# Patient Record
Sex: MALE | Race: WHITE | Employment: OTHER | URBAN - METROPOLITAN AREA
[De-identification: names, ages, dates, MRNs, and addresses within clinical notes are randomized per-mention and may not be internally consistent; named-entity substitution may affect disease eponyms.]

---

## 2019-08-02 ENCOUNTER — HOSPITAL ENCOUNTER (EMERGENCY)
Age: 84
Discharge: HOME | End: 2019-08-02
Attending: EMERGENCY MEDICINE
Payer: OTHER GOVERNMENT

## 2019-08-02 ENCOUNTER — APPOINTMENT (OUTPATIENT)
Dept: GENERAL RADIOLOGY | Age: 84
End: 2019-08-02
Attending: EMERGENCY MEDICINE
Payer: OTHER GOVERNMENT

## 2019-08-02 VITALS
HEIGHT: 72 IN | BODY MASS INDEX: 20.56 KG/M2 | DIASTOLIC BLOOD PRESSURE: 74 MMHG | OXYGEN SATURATION: 96 % | TEMPERATURE: 98.5 F | RESPIRATION RATE: 21 BRPM | SYSTOLIC BLOOD PRESSURE: 143 MMHG | HEART RATE: 87 BPM | WEIGHT: 151.8 LBS

## 2019-08-02 DIAGNOSIS — R89.9 ABNORMAL LABORATORY TEST: Primary | ICD-10-CM

## 2019-08-02 LAB
ANION GAP SERPL CALC-SCNC: 9 MMOL/L (ref 7–16)
ATRIAL RATE: 88 BPM
BUN SERPL-MCNC: 16 MG/DL (ref 8–23)
CALCIUM SERPL-MCNC: 8.9 MG/DL (ref 8.3–10.4)
CALCULATED P AXIS, ECG09: 69 DEGREES
CALCULATED R AXIS, ECG10: 38 DEGREES
CALCULATED T AXIS, ECG11: 80 DEGREES
CHLORIDE SERPL-SCNC: 102 MMOL/L (ref 98–107)
CO2 SERPL-SCNC: 29 MMOL/L (ref 21–32)
CREAT SERPL-MCNC: 1.03 MG/DL (ref 0.8–1.5)
DIAGNOSIS, 93000: NORMAL
ERYTHROCYTE [DISTWIDTH] IN BLOOD BY AUTOMATED COUNT: 13.8 % (ref 11.9–14.6)
GLUCOSE SERPL-MCNC: 93 MG/DL (ref 65–100)
HCT VFR BLD AUTO: 37.4 % (ref 41.1–50.3)
HGB BLD-MCNC: 11.9 G/DL (ref 13.6–17.2)
LACTATE BLD-SCNC: 1.35 MMOL/L (ref 0.5–1.9)
MCH RBC QN AUTO: 29.9 PG (ref 26.1–32.9)
MCHC RBC AUTO-ENTMCNC: 31.8 G/DL (ref 31.4–35)
MCV RBC AUTO: 94 FL (ref 79.6–97.8)
NRBC # BLD: 0 K/UL (ref 0–0.2)
P-R INTERVAL, ECG05: 170 MS
PLATELET # BLD AUTO: 289 K/UL (ref 150–450)
PMV BLD AUTO: 11.4 FL (ref 9.4–12.3)
POTASSIUM SERPL-SCNC: 3.9 MMOL/L (ref 3.5–5.1)
Q-T INTERVAL, ECG07: 332 MS
QRS DURATION, ECG06: 72 MS
QTC CALCULATION (BEZET), ECG08: 403 MS
RBC # BLD AUTO: 3.98 M/UL (ref 4.23–5.6)
SODIUM SERPL-SCNC: 140 MMOL/L (ref 136–145)
VENTRICULAR RATE, ECG03: 89 BPM
WBC # BLD AUTO: 7.7 K/UL (ref 4.3–11.1)

## 2019-08-02 PROCEDURE — 85027 COMPLETE CBC AUTOMATED: CPT

## 2019-08-02 PROCEDURE — 83605 ASSAY OF LACTIC ACID: CPT

## 2019-08-02 PROCEDURE — 99285 EMERGENCY DEPT VISIT HI MDM: CPT | Performed by: EMERGENCY MEDICINE

## 2019-08-02 PROCEDURE — 93005 ELECTROCARDIOGRAM TRACING: CPT | Performed by: EMERGENCY MEDICINE

## 2019-08-02 PROCEDURE — 80048 BASIC METABOLIC PNL TOTAL CA: CPT

## 2019-08-02 PROCEDURE — 71045 X-RAY EXAM CHEST 1 VIEW: CPT

## 2019-08-02 NOTE — ED TRIAGE NOTES
Pt presents with 80 yom from [facility] for abnormal lab values , elevated BNP. PT stated no complaints with [facility]. He is AAOx1, hx of Dementia per EMS.  Pt has a 20G LAC, BGL 91.jd saab , ems placed pt on 2lpm NC.

## 2019-08-02 NOTE — ED PROVIDER NOTES
22-year-old gentleman presents with concerns about having an increasing BNP. Patient is currently getting treatment at the [facility] for behavioral health. When he was admitted there he apparently had a slightly elevated BNP and then he was found a couple of days ago to have had an infiltrate on his chest x-ray. He has been receiving 1 g of Rocephin intramuscularly daily for the past 2 days for that infiltrate. Today he had jolts of blood work drawn that showed his BNP has increased to over 1000. Staff there said that he did not appear to have any increased work of breathing or difficulty breathing. They noted that his room air sats have remained normal.    Patient has some fairly significant dementia and I am unable to get a direct history from him. Elements of this note were created using speech recognition software. As such, errors of speech recognition may be present. No past medical history on file. No past surgical history on file. No family history on file.     Social History     Socioeconomic History    Marital status: Not on file     Spouse name: Not on file    Number of children: Not on file    Years of education: Not on file    Highest education level: Not on file   Occupational History    Not on file   Social Needs    Financial resource strain: Not on file    Food insecurity:     Worry: Not on file     Inability: Not on file    Transportation needs:     Medical: Not on file     Non-medical: Not on file   Tobacco Use    Smoking status: Not on file   Substance and Sexual Activity    Alcohol use: Not on file    Drug use: Not on file    Sexual activity: Not on file   Lifestyle    Physical activity:     Days per week: Not on file     Minutes per session: Not on file    Stress: Not on file   Relationships    Social connections:     Talks on phone: Not on file     Gets together: Not on file     Attends Judaism service: Not on file     Active member of club or organization: Not on file     Attends meetings of clubs or organizations: Not on file     Relationship status: Not on file    Intimate partner violence:     Fear of current or ex partner: Not on file     Emotionally abused: Not on file     Physically abused: Not on file     Forced sexual activity: Not on file   Other Topics Concern    Not on file   Social History Narrative    Not on file         ALLERGIES: Patient has no known allergies. Review of Systems   Constitutional: Negative for chills and fever. Review of systems obtained from EMS from staff at [facility]   Respiratory: Negative for cough and stridor. Gastrointestinal: Negative for vomiting. Vitals:    08/02/19 1259   BP: 134/61   Pulse: 86   Resp: 18   Temp: 98.5 °F (36.9 °C)   SpO2: 98%   Weight: 68.9 kg (151 lb 12.8 oz)   Height: 6' (1.829 m)            Physical Exam   Constitutional: He appears well-developed and well-nourished. Cardiovascular: Normal rate and regular rhythm. Pulmonary/Chest: Effort normal.   Faint scattered rales   Abdominal: He exhibits no distension. There is no tenderness. There is no guarding. Neurological:   Patient does not interact well with the exam   Nursing note and vitals reviewed. MDM  Number of Diagnoses or Management Options  Diagnosis management comments: Lactic acid is negative. His white count is unremarkable. I will get an x-ray to evaluate for infiltrate. Clinically he looks fairly decent. XR CHEST SNGL V   Final Result    IMPRESSION: No consolidation. END OF WET READ REPORT  I reviewed the complete radiology report. I included only the impression here.     Abnormal Labs Reviewed  CBC W/O DIFF - Abnormal; Notable for the following components:     RBC                           3.98 (*)               HGB                           11.9 (*)               HCT                           37.4 (*)            All other components within normal limits  I reviewed these abnormalities and I think they are relatively minor and do not represent critical abnormalities. I do not think they contribute to the patient's current presentation. They do not require further evaluation in the emergency department. I did not find anything urgent or emergent. He does not appear to have a current infiltrate and he has been receiving antibiotics. He should continue that course of antibiotics until it is completed. I do not think he needs any urgent or emergent treatment at this time and I will discharge him back.          Procedures

## 2019-08-02 NOTE — ED NOTES
Report given to [facility] in [address] at [facility]. [facility] is aware that pt is to be returning to them and that pt's vitals have remained stable with no s/sx of shob during stay. Aware discharge paperwork was given to em.

## 2019-08-02 NOTE — DISCHARGE INSTRUCTIONS
Return with any fevers, vomiting, difficulty breathing, coughing up blood, worsening symptoms, or additional concerns. Follow up with your primary care doctor as needed.

## 2019-08-02 NOTE — ED NOTES
I have reviewed discharge instructions with the caregiver. The caregiver verbalized understanding. Patient left ED via Discharge Method: stretcher to [facility] with samira    Opportunity for questions and clarification provided. Patient given 0 scripts. To continue your aftercare when you leave the hospital, you may receive an automated call from our care team to check in on how you are doing. This is a free service and part of our promise to provide the best care and service to meet your aftercare needs.  If you have questions, or wish to unsubscribe from this service please call [phone]. Thank you for Choosing our [facility] Emergency Department.

## 2019-08-03 ENCOUNTER — HOSPITAL ENCOUNTER (EMERGENCY)
Age: 84
Discharge: HOME | End: 2019-08-03
Attending: EMERGENCY MEDICINE
Payer: OTHER GOVERNMENT

## 2019-08-03 ENCOUNTER — APPOINTMENT (OUTPATIENT)
Dept: GENERAL RADIOLOGY | Age: 84
End: 2019-08-03
Attending: EMERGENCY MEDICINE
Payer: OTHER GOVERNMENT

## 2019-08-03 ENCOUNTER — APPOINTMENT (OUTPATIENT)
Dept: CT IMAGING | Age: 84
End: 2019-08-03
Attending: EMERGENCY MEDICINE
Payer: OTHER GOVERNMENT

## 2019-08-03 VITALS
WEIGHT: 151.8 LBS | RESPIRATION RATE: 24 BRPM | HEART RATE: 76 BPM | DIASTOLIC BLOOD PRESSURE: 72 MMHG | OXYGEN SATURATION: 98 % | SYSTOLIC BLOOD PRESSURE: 134 MMHG | BODY MASS INDEX: 29.8 KG/M2 | HEIGHT: 60 IN | TEMPERATURE: 98.7 F

## 2019-08-03 DIAGNOSIS — R06.02 SOB (SHORTNESS OF BREATH): Primary | ICD-10-CM

## 2019-08-03 LAB
ALBUMIN SERPL-MCNC: 2.9 G/DL (ref 3.2–4.6)
ALBUMIN/GLOB SERPL: 0.7 {RATIO} (ref 1.2–3.5)
ALP SERPL-CCNC: 74 U/L (ref 50–136)
ALT SERPL-CCNC: 8 U/L (ref 12–65)
ANION GAP SERPL CALC-SCNC: 5 MMOL/L (ref 7–16)
APPEARANCE UR: CLEAR
ARTERIAL PATENCY WRIST A: YES
AST SERPL-CCNC: 38 U/L (ref 15–37)
ATRIAL RATE: 84 BPM
BASE EXCESS BLD CALC-SCNC: 3 MMOL/L
BASOPHILS # BLD: 0 K/UL (ref 0–0.2)
BASOPHILS NFR BLD: 0 % (ref 0–2)
BDY SITE: ABNORMAL
BILIRUB SERPL-MCNC: 0.7 MG/DL (ref 0.2–1.1)
BILIRUB UR QL: ABNORMAL
BNP SERPL-MCNC: 216 PG/ML
BODY TEMPERATURE: 98.6
BUN SERPL-MCNC: 21 MG/DL (ref 8–23)
CALCIUM SERPL-MCNC: 8.8 MG/DL (ref 8.3–10.4)
CALCULATED P AXIS, ECG09: 78 DEGREES
CALCULATED R AXIS, ECG10: 31 DEGREES
CALCULATED T AXIS, ECG11: 75 DEGREES
CHLORIDE SERPL-SCNC: 103 MMOL/L (ref 98–107)
CO2 BLD-SCNC: 27 MMOL/L
CO2 SERPL-SCNC: 30 MMOL/L (ref 21–32)
COLLECT TIME,HTIME: 1218
COLOR UR: ABNORMAL
CREAT SERPL-MCNC: 1.19 MG/DL (ref 0.8–1.5)
D DIMER PPP FEU-MCNC: 2.51 UG/ML(FEU)
DIAGNOSIS, 93000: NORMAL
DIFFERENTIAL METHOD BLD: ABNORMAL
EOSINOPHIL # BLD: 0 K/UL (ref 0–0.8)
EOSINOPHIL NFR BLD: 0 % (ref 0.5–7.8)
ERYTHROCYTE [DISTWIDTH] IN BLOOD BY AUTOMATED COUNT: 13.7 % (ref 11.9–14.6)
GAS FLOW.O2 O2 DELIVERY SYS: ABNORMAL L/MIN
GLOBULIN SER CALC-MCNC: 4.3 G/DL (ref 2.3–3.5)
GLUCOSE SERPL-MCNC: 118 MG/DL (ref 65–100)
GLUCOSE UR STRIP.AUTO-MCNC: NEGATIVE MG/DL
HCO3 BLD-SCNC: 26.4 MMOL/L (ref 22–26)
HCT VFR BLD AUTO: 35.2 % (ref 41.1–50.3)
HGB BLD-MCNC: 11.3 G/DL (ref 13.6–17.2)
HGB UR QL STRIP: NEGATIVE
IMM GRANULOCYTES # BLD AUTO: 0 K/UL (ref 0–0.5)
IMM GRANULOCYTES NFR BLD AUTO: 0 % (ref 0–5)
KETONES UR QL STRIP.AUTO: ABNORMAL MG/DL
LACTATE BLD-SCNC: 1.37 MMOL/L (ref 0.5–1.9)
LEUKOCYTE ESTERASE UR QL STRIP.AUTO: NEGATIVE
LIPASE SERPL-CCNC: 155 U/L (ref 73–393)
LYMPHOCYTES # BLD: 1.1 K/UL (ref 0.5–4.6)
LYMPHOCYTES NFR BLD: 10 % (ref 13–44)
MCH RBC QN AUTO: 30 PG (ref 26.1–32.9)
MCHC RBC AUTO-ENTMCNC: 32.1 G/DL (ref 31.4–35)
MCV RBC AUTO: 93.4 FL (ref 79.6–97.8)
MONOCYTES # BLD: 1.5 K/UL (ref 0.1–1.3)
MONOCYTES NFR BLD: 14 % (ref 4–12)
NEUTS SEG # BLD: 8.3 K/UL (ref 1.7–8.2)
NEUTS SEG NFR BLD: 76 % (ref 43–78)
NITRITE UR QL STRIP.AUTO: NEGATIVE
NRBC # BLD: 0 K/UL (ref 0–0.2)
O2/TOTAL GAS SETTING VFR VENT: 21 %
P-R INTERVAL, ECG05: 162 MS
PCO2 BLD: 34.3 MMHG (ref 35–45)
PH BLD: 7.49 [PH] (ref 7.35–7.45)
PH UR STRIP: 5.5 [PH] (ref 5–9)
PLATELET # BLD AUTO: 262 K/UL (ref 150–450)
PMV BLD AUTO: 10.6 FL (ref 9.4–12.3)
PO2 BLD: 108 MMHG (ref 75–100)
POTASSIUM SERPL-SCNC: 4.2 MMOL/L (ref 3.5–5.1)
PROT SERPL-MCNC: 7.2 G/DL (ref 6.3–8.2)
PROT UR STRIP-MCNC: NEGATIVE MG/DL
Q-T INTERVAL, ECG07: 372 MS
QRS DURATION, ECG06: 72 MS
QTC CALCULATION (BEZET), ECG08: 420 MS
RBC # BLD AUTO: 3.77 M/UL (ref 4.23–5.6)
SAO2 % BLD: 99 % (ref 95–98)
SERVICE CMNT-IMP: ABNORMAL
SERVICE CMNT-IMP: ABNORMAL
SODIUM SERPL-SCNC: 138 MMOL/L (ref 136–145)
SP GR UR REFRACTOMETRY: 1.03 (ref 1–1.02)
SPECIMEN TYPE: ABNORMAL
UROBILINOGEN UR QL STRIP.AUTO: 4 EU/DL (ref 0.2–1)
VENTRICULAR RATE, ECG03: 77 BPM
WBC # BLD AUTO: 10.8 K/UL (ref 4.3–11.1)

## 2019-08-03 PROCEDURE — 77030011943

## 2019-08-03 PROCEDURE — 83605 ASSAY OF LACTIC ACID: CPT

## 2019-08-03 PROCEDURE — 96360 HYDRATION IV INFUSION INIT: CPT | Performed by: EMERGENCY MEDICINE

## 2019-08-03 PROCEDURE — 82803 BLOOD GASES ANY COMBINATION: CPT

## 2019-08-03 PROCEDURE — 70450 CT HEAD/BRAIN W/O DYE: CPT

## 2019-08-03 PROCEDURE — 85379 FIBRIN DEGRADATION QUANT: CPT

## 2019-08-03 PROCEDURE — 81003 URINALYSIS AUTO W/O SCOPE: CPT

## 2019-08-03 PROCEDURE — 83880 ASSAY OF NATRIURETIC PEPTIDE: CPT

## 2019-08-03 PROCEDURE — 71045 X-RAY EXAM CHEST 1 VIEW: CPT

## 2019-08-03 PROCEDURE — 80053 COMPREHEN METABOLIC PANEL: CPT

## 2019-08-03 PROCEDURE — 93005 ELECTROCARDIOGRAM TRACING: CPT | Performed by: EMERGENCY MEDICINE

## 2019-08-03 PROCEDURE — 36600 WITHDRAWAL OF ARTERIAL BLOOD: CPT

## 2019-08-03 PROCEDURE — 99285 EMERGENCY DEPT VISIT HI MDM: CPT | Performed by: EMERGENCY MEDICINE

## 2019-08-03 PROCEDURE — 85025 COMPLETE CBC W/AUTO DIFF WBC: CPT

## 2019-08-03 PROCEDURE — 51701 INSERT BLADDER CATHETER: CPT | Performed by: EMERGENCY MEDICINE

## 2019-08-03 PROCEDURE — 74011250636 HC RX REV CODE- 250/636: Performed by: EMERGENCY MEDICINE

## 2019-08-03 PROCEDURE — 73030 X-RAY EXAM OF SHOULDER: CPT

## 2019-08-03 PROCEDURE — 83690 ASSAY OF LIPASE: CPT

## 2019-08-03 RX ADMIN — SODIUM CHLORIDE 1000 ML: 900 INJECTION, SOLUTION INTRAVENOUS at 11:51

## 2019-08-03 NOTE — DISCHARGE INSTRUCTIONS

## 2019-08-03 NOTE — ED NOTES
I have reviewed discharge instructions with the caregiver. The caregiver verbalized understanding. Patient left ED via Discharge Method: stretcher to [facility] with caregiver). Opportunity for questions and clarification provided. Patient given 0 scripts. To continue your aftercare when you leave the hospital, you may receive an automated call from our care team to check in on how you are doing. This is a free service and part of our promise to provide the best care and service to meet your aftercare needs.  If you have questions, or wish to unsubscribe from this service please call [phone]. Thank you for Choosing our [facility] Emergency Department.

## 2019-08-03 NOTE — ED TRIAGE NOTES
Pt presents with [facility] c/o abnormal lab value , elevated BNP > 2000 per EMS. Pt is disoriented with a hx of behavioral problems. Ems stated he is from [facility]. Pt states no complaints at this point. He was at our facility yesterday with the same complaint per EMS.

## 2019-08-03 NOTE — ED NOTES
Patient resting in bed with eyes closed. Sitter at bedside. Pt has equal breath rises, VSS. This RN will continue to monitor.

## 2019-08-03 NOTE — ED NOTES
EMS states patient is very combative usually. [facility] medicated patient before he left, they state once the medicine wears off, pt will become combative.

## 2019-08-03 NOTE — ED PROVIDER NOTES
Patient is at [address] [place] [address] for Dementia with behavioral disturbance. Since the first has had some cough with shortness of breath and hypoxia. Chest x-ray showed pneumonia. Patient being given Rocephin. More recent blood work showed elevated d-dimer at 2.5. This with the hypoxia is why the patient was sent here today. Facility feels he is more agitated than normal.  No vomiting or diarrhea. The history is provided by the EMS personnel. The history is limited by the condition of the patient. Abnormal Lab Results   This is a new problem. The current episode started yesterday. The problem occurs constantly. The problem has not changed since onset. Associated symptoms include shortness of breath. Nothing aggravates the symptoms. Nothing relieves the symptoms. Treatments tried: abx. No past medical history on file. No past surgical history on file. No family history on file.     Social History     Socioeconomic History    Marital status: SINGLE     Spouse name: Not on file    Number of children: Not on file    Years of education: Not on file    Highest education level: Not on file   Occupational History    Not on file   Social Needs    Financial resource strain: Not on file    Food insecurity:     Worry: Not on file     Inability: Not on file    Transportation needs:     Medical: Not on file     Non-medical: Not on file   Tobacco Use    Smoking status: Not on file   Substance and Sexual Activity    Alcohol use: Not on file    Drug use: Not on file    Sexual activity: Not on file   Lifestyle    Physical activity:     Days per week: Not on file     Minutes per session: Not on file    Stress: Not on file   Relationships    Social connections:     Talks on phone: Not on file     Gets together: Not on file     Attends Druze service: Not on file     Active member of club or organization: Not on file     Attends meetings of clubs or organizations: Not on file     Relationship status: Not on file  Intimate partner violence:     Fear of current or ex partner: Not on file     Emotionally abused: Not on file     Physically abused: Not on file     Forced sexual activity: Not on file   Other Topics Concern    Not on file   Social History Narrative    Not on file         ALLERGIES: Patient has no known allergies. Review of Systems   Unable to perform ROS: Dementia   Respiratory: Positive for shortness of breath. Vitals:    08/03/19 1103 08/03/19 1117 08/03/19 1128 08/03/19 1133   BP:       Pulse: 80 88 72 79   Resp: (!) 32 21 26 16   SpO2: 98%      Weight:       Height:                Physical Exam   Constitutional: He appears well-developed and well-nourished. HENT:   Head: Normocephalic and atraumatic. Eyes: Pupils are equal, round, and reactive to light. Conjunctivae are normal.   Neck: Normal range of motion. Neck supple. Cardiovascular: Normal rate and regular rhythm. Pulmonary/Chest: Effort normal and breath sounds normal. No respiratory distress. He has no wheezes. Abdominal: Soft. Bowel sounds are normal. There is no tenderness. Musculoskeletal: Normal range of motion. He exhibits no edema. Neurological: He is alert. No cranial nerve deficit. Skin: Skin is warm and dry. MDM  Number of Diagnoses or Management Options  Diagnosis management comments: Patient sent from [facility] for Elevated d-dimer. Had some shortness of breath at the facility with possible hypoxia, but none here on room air pulse ox 97%. No tachycardia. Notes reveal patient is being treated with Rocephin for possible pneumonia. Our chest x-ray shows no consolidation, so antibiotics likely working. CT of the head negative. We will discharge patient with continued treatment.        Amount and/or Complexity of Data Reviewed  Clinical lab tests: ordered and reviewed  Tests in the radiology section of CPT®: ordered and reviewed  Tests in the medicine section of CPT®: ordered and reviewed    Patient Progress  Patient progress: stable         Procedures        EKG: normal sinus rhythm, nonspecific ST and T waves changes. Rate 77. XR SHOULDER RT AP/LAT MIN 2 V (Final result)   Result time 08/03/19 13:03:31   Final result by Shira Gasca MD (08/03/19 13:03:31)                Impression:    IMPRESSION: No displaced fracture. Narrative:    RIGHT SHOULDER 2 VIEWS    HISTORY: Pain after fall    FINDINGS: There is no displaced fracture or dislocation. Included portion of the  right lung is clear. Bones are osteopenic.                    CT HEAD WO CONT (Final result)   Result time 08/03/19 12:52:05   Final result by Shira Gasca MD (08/03/19 12:52:05)                Impression:    IMPRESSION: No acute intracranial findings. Narrative:    HEAD CT WITHOUT CONTRAST  8/3/2019    HISTORY:   agitation  ; disorientation with history of behavioral problems    TECHNIQUE: Noncontrast axial images were obtained through the brain.  All CT  scans at this facility used dose modulation, interactive reconstruction and/or  weight based dosing when appropriate to reduce radiation dose to as low as  reasonably achievable. COMPARISON: None    FINDINGS: There is no acute intracranial hemorrhage, significant mass effect or  CT evidence of acute large artery territorial infarction. Please note that a  hyperacute infarct or small vessel infarct may not be apparent on initial CT  imaging. Moderate cerebral volume loss is present with compensatory ventricular  enlargement. There is no hydrocephalus , intra-axial mass or abnormal extra-axial fluid  collection. There are no displaced skull fractures. The mastoid air cells and  paranasal sinuses are clear where imaged.                    XR CHEST PORT (Final result)   Result time 08/03/19 12:42:44   Final result by Shira Gasca MD (08/03/19 12:42:44)                Impression:    IMPRESSION: No consolidation.             Narrative: AP PORTABLE CHEST X-RAY    HISTORY: Cough    COMPARISON: August 2, 2019    FINDINGS: EKG leads are present. There is no consolidation, pleural effusions or  pulmonary edema.                  Results Include:    Recent Results (from the past 24 hour(s))   CBC WITH AUTOMATED DIFF    Collection Time: 08/03/19 10:59 AM   Result Value Ref Range    WBC 10.8 4.3 - 11.1 K/uL    RBC 3.77 (L) 4.23 - 5.6 M/uL    HGB 11.3 (L) 13.6 - 17.2 g/dL    HCT 35.2 (L) 41.1 - 50.3 %    MCV 93.4 79.6 - 97.8 FL    MCH 30.0 26.1 - 32.9 PG    MCHC 32.1 31.4 - 35.0 g/dL    RDW 13.7 11.9 - 14.6 %    PLATELET 920 161 - 862 K/uL    MPV 10.6 9.4 - 12.3 FL    ABSOLUTE NRBC 0.00 0.0 - 0.2 K/uL    DF AUTOMATED      NEUTROPHILS 76 43 - 78 %    LYMPHOCYTES 10 (L) 13 - 44 %    MONOCYTES 14 (H) 4.0 - 12.0 %    EOSINOPHILS 0 (L) 0.5 - 7.8 %    BASOPHILS 0 0.0 - 2.0 %    IMMATURE GRANULOCYTES 0 0.0 - 5.0 %    ABS. NEUTROPHILS 8.3 (H) 1.7 - 8.2 K/UL    ABS. LYMPHOCYTES 1.1 0.5 - 4.6 K/UL    ABS. MONOCYTES 1.5 (H) 0.1 - 1.3 K/UL    ABS. EOSINOPHILS 0.0 0.0 - 0.8 K/UL    ABS. BASOPHILS 0.0 0.0 - 0.2 K/UL    ABS. IMM. GRANS. 0.0 0.0 - 0.5 K/UL   METABOLIC PANEL, COMPREHENSIVE    Collection Time: 08/03/19 10:59 AM   Result Value Ref Range    Sodium 138 136 - 145 mmol/L    Potassium 4.2 3.5 - 5.1 mmol/L    Chloride 103 98 - 107 mmol/L    CO2 30 21 - 32 mmol/L    Anion gap 5 (L) 7 - 16 mmol/L    Glucose 118 (H) 65 - 100 mg/dL    BUN 21 8 - 23 MG/DL    Creatinine 1.19 0.8 - 1.5 MG/DL    GFR est AA >60 >60 ml/min/1.73m2    GFR est non-AA >60 >60 ml/min/1.73m2    Calcium 8.8 8.3 - 10.4 MG/DL    Bilirubin, total 0.7 0.2 - 1.1 MG/DL    ALT (SGPT) 8 (L) 12 - 65 U/L    AST (SGOT) 38 (H) 15 - 37 U/L    Alk.  phosphatase 74 50 - 136 U/L    Protein, total 7.2 6.3 - 8.2 g/dL    Albumin 2.9 (L) 3.2 - 4.6 g/dL    Globulin 4.3 (H) 2.3 - 3.5 g/dL    A-G Ratio 0.7 (L) 1.2 - 3.5     BNP    Collection Time: 08/03/19 10:59 AM   Result Value Ref Range     (H) 0 pg/mL LIPASE    Collection Time: 08/03/19 10:59 AM   Result Value Ref Range    Lipase 155 73 - 393 U/L   D DIMER    Collection Time: 08/03/19 10:59 AM   Result Value Ref Range    D DIMER 2.51 (HH) <0.56 ug/ml(FEU)   EKG, 12 LEAD, INITIAL    Collection Time: 08/03/19 11:38 AM   Result Value Ref Range    Ventricular Rate 77 BPM    Atrial Rate 84 BPM    P-R Interval 162 ms    QRS Duration 72 ms    Q-T Interval 372 ms    QTC Calculation (Bezet) 420 ms    Calculated P Axis 78 degrees    Calculated R Axis 31 degrees    Calculated T Axis 75 degrees    Diagnosis       !! AGE AND GENDER SPECIFIC ECG ANALYSIS !! Normal sinus rhythm  Low voltage QRS  Nonspecific T wave abnormality  Abnormal ECG  When compared with ECG of 02-AUG-2019 12:59,  Premature ventricular complexes are no longer Present  Confirmed by Matthew Christianson ([id number]) on 8/3/2019 12:48:42 PM     POC LACTIC ACID    Collection Time: 08/03/19 11:39 AM   Result Value Ref Range    Lactic Acid (POC) 1.37 0.5 - 1.9 mmol/L   URINALYSIS W/ RFLX MICROSCOPIC    Collection Time: 08/03/19 12:17 PM   Result Value Ref Range    Color JEANIE      Appearance CLEAR      Specific gravity 1.026 (H) 1.001 - 1.023      pH (UA) 5.5 5.0 - 9.0      Protein NEGATIVE  NEG mg/dL    Glucose NEGATIVE  mg/dL    Ketone TRACE (A) NEG mg/dL    Bilirubin SMALL (A) NEG      Blood NEGATIVE  NEG      Urobilinogen 4.0 (H) 0.2 - 1.0 EU/dL    Nitrites NEGATIVE  NEG      Leukocyte Esterase NEGATIVE  NEG     POC G3    Collection Time: 08/03/19 12:26 PM   Result Value Ref Range    Device: ROOM AIR      FIO2 (POC) 21 %    pH (POC) 7.494 (H) 7.35 - 7.45      pCO2 (POC) 34.3 (L) 35 - 45 MMHG    pO2 (POC) 108 (H) 75 - 100 MMHG    HCO3 (POC) 26.4 (H) 22 - 26 MMOL/L    sO2 (POC) 99 (H) 95 - 98 %    Base excess (POC) 3 mmol/L    Allens test (POC) YES      Site RIGHT RADIAL      Patient temp.  98.6      Specimen type (POC) ARTERIAL      Performed by Neela     CO2, POC 27 MMOL/L    Critical value read back 00:01 COLLECT TIME 1,605

## 2019-08-04 ENCOUNTER — APPOINTMENT (OUTPATIENT)
Dept: CT IMAGING | Age: 84
End: 2019-08-04
Attending: PHYSICIAN ASSISTANT
Payer: OTHER GOVERNMENT

## 2019-08-04 ENCOUNTER — HOSPITAL ENCOUNTER (EMERGENCY)
Age: 84
Discharge: HOME | End: 2019-08-04
Attending: EMERGENCY MEDICINE
Payer: OTHER GOVERNMENT

## 2019-08-04 VITALS
TEMPERATURE: 97.8 F | SYSTOLIC BLOOD PRESSURE: 135 MMHG | WEIGHT: 155 LBS | HEIGHT: 72 IN | RESPIRATION RATE: 20 BRPM | DIASTOLIC BLOOD PRESSURE: 85 MMHG | OXYGEN SATURATION: 92 % | BODY MASS INDEX: 20.99 KG/M2 | HEART RATE: 88 BPM

## 2019-08-04 DIAGNOSIS — J44.9 CHRONIC OBSTRUCTIVE PULMONARY DISEASE, UNSPECIFIED COPD TYPE (HCC): ICD-10-CM

## 2019-08-04 DIAGNOSIS — J18.9 PNEUMONIA OF LEFT LOWER LOBE DUE TO INFECTIOUS ORGANISM: Primary | ICD-10-CM

## 2019-08-04 PROCEDURE — 71260 CT THORAX DX C+: CPT

## 2019-08-04 PROCEDURE — 99284 EMERGENCY DEPT VISIT MOD MDM: CPT | Performed by: PHYSICIAN ASSISTANT

## 2019-08-04 PROCEDURE — 74011000258 HC RX REV CODE- 258: Performed by: EMERGENCY MEDICINE

## 2019-08-04 PROCEDURE — 74011636320 HC RX REV CODE- 636/320: Performed by: EMERGENCY MEDICINE

## 2019-08-04 RX ORDER — SODIUM CHLORIDE 0.9 % (FLUSH) 0.9 %
10 SYRINGE (ML) INJECTION
Status: COMPLETED | OUTPATIENT
Start: 2019-08-04 | End: 2019-08-04

## 2019-08-04 RX ORDER — DOXYCYCLINE HYCLATE 100 MG
100 TABLET ORAL 2 TIMES DAILY
Qty: 20 TAB | Refills: 0 | Status: SHIPPED | OUTPATIENT
Start: 2019-08-04 | End: 2019-08-14

## 2019-08-04 RX ADMIN — Medication 10 ML: at 16:08

## 2019-08-04 RX ADMIN — IOPAMIDOL 100 ML: 755 INJECTION, SOLUTION INTRAVENOUS at 16:08

## 2019-08-04 RX ADMIN — SODIUM CHLORIDE 100 ML: 900 INJECTION, SOLUTION INTRAVENOUS at 16:08

## 2019-08-04 NOTE — ED TRIAGE NOTES
Patient presents from [facility] with no complaints.  Per EMS and [facility] staff patient had elevated D dimer yesterday and they are wanting rule out testing for PE.

## 2019-08-04 NOTE — ED PROVIDER NOTES
Pt sent back to er for chest ct due to elevated d dimer, pt dose have pneumonia and is currently being treated with daily 1 g injections of Rocephin per his MAR and last of the 3 injections yesterday, patient currently in no distress o 2 sats 97% on room air     The history is provided by the EMS personnel ([facility]). Abnormal Lab Results   This is a new problem. The current episode started yesterday. The problem occurs constantly. The problem has not changed since onset. Nothing aggravates the symptoms. Nothing relieves the symptoms. Treatments tried: antiBiotics for pneumonia. The treatment provided moderate relief. No past medical history on file. No past surgical history on file. No family history on file.     Social History     Socioeconomic History    Marital status: SINGLE     Spouse name: Not on file    Number of children: Not on file    Years of education: Not on file    Highest education level: Not on file   Occupational History    Not on file   Social Needs    Financial resource strain: Not on file    Food insecurity:     Worry: Not on file     Inability: Not on file    Transportation needs:     Medical: Not on file     Non-medical: Not on file   Tobacco Use    Smoking status: Not on file   Substance and Sexual Activity    Alcohol use: Not on file    Drug use: Not on file    Sexual activity: Not on file   Lifestyle    Physical activity:     Days per week: Not on file     Minutes per session: Not on file    Stress: Not on file   Relationships    Social connections:     Talks on phone: Not on file     Gets together: Not on file     Attends [employer] service: Not on file     Active member of club or organization: Not on file     Attends meetings of clubs or organizations: Not on file     Relationship status: Not on file    Intimate partner violence:     Fear of current or ex partner: Not on file     Emotionally abused: Not on file     Physically abused: Not on file     Forced sexual activity: Not on file   Other Topics Concern    Not on file   Social History Narrative    Not on file         ALLERGIES: Patient has no known allergies. Review of Systems   Unable to perform ROS: Dementia   All other systems reviewed and are negative. Vitals:    08/04/19 1526   BP: (!) 148/99   Pulse: 79   Resp: 16   Temp: 98.2 °F (36.8 °C)   SpO2: 97%   Weight: 70.3 kg (155 lb)   Height: 6' (1.829 m)            Physical Exam   Constitutional: He is oriented to person, place, and time. He appears well-developed and well-nourished. No distress. HENT:   Head: Normocephalic and atraumatic. Eyes: Pupils are equal, round, and reactive to light. EOM are normal.   Neck: Normal range of motion. Neck supple. Cardiovascular: Normal rate and regular rhythm. Pulmonary/Chest: Effort normal and breath sounds normal. He has no wheezes. He has no rales. Abdominal: Soft. Bowel sounds are normal.   Musculoskeletal: Normal range of motion. He exhibits no edema. Neurological: He is alert and oriented to person, place, and time. Skin: Skin is warm. He is not diaphoretic. Psychiatric: He has a normal mood and affect. Nursing note and vitals reviewed.        MDM  Number of Diagnoses or Management Options  Diagnosis management comments: chest CT shows no evidence of pulmonary embolism, there is some COPD and some other pulmonary nodules that radiology recommends repeat CT in 3 months  Patient still has some consolidation in the left lower lung we will place patient on doxycycline for 10 days and repeat chest x ray  and he is to return to [facility] discussed with Dr. Shirley Bauer       Amount and/or Complexity of Data Reviewed  Tests in the radiology section of CPT®: ordered and reviewed  Review and summarize past medical records: yes  Discuss the patient with other providers: yes Michaela Serene)    Risk of Complications, Morbidity, and/or Mortality  Presenting problems: low  Diagnostic procedures: low  Management options: low    Patient Progress  Patient progress: improved         Procedures

## 2019-08-04 NOTE — DISCHARGE INSTRUCTIONS
No PE found on chest ct, will use doxycycline for continued LLL infiltrate, stable to return to [facility]

## 2019-08-04 NOTE — ED NOTES
I have reviewed discharge instructions with the patient's [facility]. Beatriz was handed the patient's discharge paperwork and prescription to give to the nurse at [facility]. Patient left ED via Discharge Method: stretcher to [facility] with [facility]. Opportunity for questions and clarification provided. Patient given 1 scripts. To continue your aftercare when you leave the hospital, you may receive an automated call from our care team to check in on how you are doing. This is a free service and part of our promise to provide the best care and service to meet your aftercare needs.  If you have questions, or wish to unsubscribe from this service please call [phone]. Thank you for Choosing our [facility] Emergency Department.

## 2019-08-04 NOTE — ED NOTES
TRANSFER - OUT REPORT:    Verbal report given to MAUDE Noel. on May Mehta  being transferred to [facility] for routine progression of care       Report consisted of patients Situation, Background, Assessment and   Recommendations(SBAR). Information from the following report(s) SBAR, ED Summary and Recent Results was reviewed with the receiving nurse. Lines:       Opportunity for questions and clarification was provided.       Patient transported with:   [facility]

## 2019-08-22 ENCOUNTER — APPOINTMENT (OUTPATIENT)
Dept: GENERAL RADIOLOGY | Age: 84
DRG: 683 | End: 2019-08-22
Payer: MEDICARE

## 2019-08-22 ENCOUNTER — HOSPITAL ENCOUNTER (EMERGENCY)
Age: 84
Discharge: HOME | DRG: 683 | End: 2019-08-23
Payer: MEDICARE

## 2019-08-22 DIAGNOSIS — F03.91 DEMENTIA WITH BEHAVIORAL DISTURBANCE, UNSPECIFIED DEMENTIA TYPE: Primary | ICD-10-CM

## 2019-08-22 LAB
ALBUMIN SERPL-MCNC: 2.8 G/DL (ref 3.2–4.6)
ALBUMIN/GLOB SERPL: 0.8 {RATIO} (ref 1.2–3.5)
ALP SERPL-CCNC: 54 U/L (ref 50–136)
ALT SERPL-CCNC: 16 U/L (ref 12–65)
ANION GAP SERPL CALC-SCNC: 4 MMOL/L (ref 7–16)
APPEARANCE UR: CLEAR
AST SERPL-CCNC: 35 U/L (ref 15–37)
BASOPHILS # BLD: 0 K/UL (ref 0–0.2)
BASOPHILS NFR BLD: 0 % (ref 0–2)
BILIRUB SERPL-MCNC: 0.3 MG/DL (ref 0.2–1.1)
BILIRUB UR QL: NEGATIVE
BUN SERPL-MCNC: 27 MG/DL (ref 8–23)
CALCIUM SERPL-MCNC: 8.8 MG/DL (ref 8.3–10.4)
CHLORIDE SERPL-SCNC: 106 MMOL/L (ref 98–107)
CO2 SERPL-SCNC: 33 MMOL/L (ref 21–32)
COLOR UR: YELLOW
CREAT SERPL-MCNC: 1.07 MG/DL (ref 0.8–1.5)
DIFFERENTIAL METHOD BLD: ABNORMAL
EOSINOPHIL # BLD: 0.1 K/UL (ref 0–0.8)
EOSINOPHIL NFR BLD: 1 % (ref 0.5–7.8)
ERYTHROCYTE [DISTWIDTH] IN BLOOD BY AUTOMATED COUNT: 15.3 % (ref 11.9–14.6)
GLOBULIN SER CALC-MCNC: 3.6 G/DL (ref 2.3–3.5)
GLUCOSE SERPL-MCNC: 96 MG/DL (ref 65–100)
GLUCOSE UR STRIP.AUTO-MCNC: NEGATIVE MG/DL
HCT VFR BLD AUTO: 33.7 % (ref 41.1–50.3)
HGB BLD-MCNC: 10.4 G/DL (ref 13.6–17.2)
HGB UR QL STRIP: NEGATIVE
IMM GRANULOCYTES # BLD AUTO: 0.1 K/UL (ref 0–0.5)
IMM GRANULOCYTES NFR BLD AUTO: 1 % (ref 0–5)
KETONES UR QL STRIP.AUTO: NEGATIVE MG/DL
LEUKOCYTE ESTERASE UR QL STRIP.AUTO: NEGATIVE
LYMPHOCYTES # BLD: 2 K/UL (ref 0.5–4.6)
LYMPHOCYTES NFR BLD: 21 % (ref 13–44)
MCH RBC QN AUTO: 29.8 PG (ref 26.1–32.9)
MCHC RBC AUTO-ENTMCNC: 30.9 G/DL (ref 31.4–35)
MCV RBC AUTO: 96.6 FL (ref 79.6–97.8)
MONOCYTES # BLD: 0.7 K/UL (ref 0.1–1.3)
MONOCYTES NFR BLD: 7 % (ref 4–12)
NEUTS SEG # BLD: 6.4 K/UL (ref 1.7–8.2)
NEUTS SEG NFR BLD: 70 % (ref 43–78)
NITRITE UR QL STRIP.AUTO: NEGATIVE
NRBC # BLD: 0 K/UL (ref 0–0.2)
PH UR STRIP: 6.5 [PH] (ref 5–9)
PLATELET # BLD AUTO: 238 K/UL (ref 150–450)
PMV BLD AUTO: 10.2 FL (ref 9.4–12.3)
POTASSIUM SERPL-SCNC: 4.1 MMOL/L (ref 3.5–5.1)
PROT SERPL-MCNC: 6.4 G/DL (ref 6.3–8.2)
PROT UR STRIP-MCNC: NEGATIVE MG/DL
RBC # BLD AUTO: 3.49 M/UL (ref 4.23–5.6)
SODIUM SERPL-SCNC: 143 MMOL/L (ref 136–145)
SP GR UR REFRACTOMETRY: 1.01 (ref 1–1.02)
UROBILINOGEN UR QL STRIP.AUTO: 0.2 EU/DL (ref 0.2–1)
WBC # BLD AUTO: 9.2 K/UL (ref 4.3–11.1)

## 2019-08-22 PROCEDURE — 81003 URINALYSIS AUTO W/O SCOPE: CPT

## 2019-08-22 PROCEDURE — 96372 THER/PROPH/DIAG INJ SC/IM: CPT

## 2019-08-22 PROCEDURE — 71045 X-RAY EXAM CHEST 1 VIEW: CPT

## 2019-08-22 PROCEDURE — 85025 COMPLETE CBC W/AUTO DIFF WBC: CPT

## 2019-08-22 PROCEDURE — 99285 EMERGENCY DEPT VISIT HI MDM: CPT

## 2019-08-22 PROCEDURE — 74011250636 HC RX REV CODE- 250/636

## 2019-08-22 PROCEDURE — 93005 ELECTROCARDIOGRAM TRACING: CPT

## 2019-08-22 PROCEDURE — 80053 COMPREHEN METABOLIC PANEL: CPT

## 2019-08-22 PROCEDURE — 96374 THER/PROPH/DIAG INJ IV PUSH: CPT

## 2019-08-22 RX ORDER — PREDNISONE 10 MG/1
10 TABLET ORAL
COMMUNITY

## 2019-08-22 RX ORDER — OMEPRAZOLE 20 MG/1
20 CAPSULE, DELAYED RELEASE ORAL DAILY
COMMUNITY

## 2019-08-22 RX ORDER — ACETAMINOPHEN 325 MG/1
650 TABLET ORAL
COMMUNITY

## 2019-08-22 RX ORDER — DIPHENHYDRAMINE HYDROCHLORIDE 50 MG/ML
25 INJECTION, SOLUTION INTRAMUSCULAR; INTRAVENOUS
Status: COMPLETED | OUTPATIENT
Start: 2019-08-22 | End: 2019-08-22

## 2019-08-22 RX ORDER — HALOPERIDOL 5 MG/ML
10 INJECTION INTRAMUSCULAR
Status: COMPLETED | OUTPATIENT
Start: 2019-08-22 | End: 2019-08-22

## 2019-08-22 RX ORDER — OLANZAPINE 10 MG/1
5 TABLET ORAL
COMMUNITY
End: 2019-08-30

## 2019-08-22 RX ORDER — LORAZEPAM 1 MG/1
1 TABLET ORAL
COMMUNITY
End: 2019-08-30

## 2019-08-22 RX ADMIN — DIPHENHYDRAMINE HYDROCHLORIDE 25 MG: 50 INJECTION, SOLUTION INTRAMUSCULAR; INTRAVENOUS at 23:32

## 2019-08-22 RX ADMIN — HALOPERIDOL LACTATE 10 MG: 5 INJECTION, SOLUTION INTRAMUSCULAR at 23:32

## 2019-08-23 ENCOUNTER — HOSPITAL ENCOUNTER (EMERGENCY)
Age: 84
Discharge: HOME | DRG: 683 | End: 2019-08-23
Attending: EMERGENCY MEDICINE | Admitting: EMERGENCY MEDICINE
Payer: MEDICARE

## 2019-08-23 ENCOUNTER — APPOINTMENT (OUTPATIENT)
Dept: CT IMAGING | Age: 84
DRG: 683 | End: 2019-08-23
Attending: EMERGENCY MEDICINE
Payer: MEDICARE

## 2019-08-23 VITALS
WEIGHT: 154.98 LBS | OXYGEN SATURATION: 96 % | TEMPERATURE: 97.7 F | HEART RATE: 88 BPM | RESPIRATION RATE: 18 BRPM | SYSTOLIC BLOOD PRESSURE: 153 MMHG | HEIGHT: 72 IN | BODY MASS INDEX: 20.99 KG/M2 | DIASTOLIC BLOOD PRESSURE: 86 MMHG

## 2019-08-23 VITALS
SYSTOLIC BLOOD PRESSURE: 148 MMHG | OXYGEN SATURATION: 98 % | TEMPERATURE: 97.9 F | RESPIRATION RATE: 15 BRPM | DIASTOLIC BLOOD PRESSURE: 70 MMHG | HEART RATE: 70 BPM

## 2019-08-23 DIAGNOSIS — S01.01XA LACERATION OF SCALP WITHOUT FOREIGN BODY, INITIAL ENCOUNTER: Primary | ICD-10-CM

## 2019-08-23 DIAGNOSIS — F03.90 DEMENTIA WITHOUT BEHAVIORAL DISTURBANCE, UNSPECIFIED DEMENTIA TYPE: ICD-10-CM
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
## 2019-08-23 LAB
ATRIAL RATE: 61 BPM
CALCULATED R AXIS, ECG10: -3 DEGREES
CALCULATED T AXIS, ECG11: 39 DEGREES
DIAGNOSIS, 93000: NORMAL
Q-T INTERVAL, ECG07: 406 MS
QRS DURATION, ECG06: 80 MS
QTC CALCULATION (BEZET), ECG08: 408 MS
VENTRICULAR RATE, ECG03: 61 BPM

## 2019-08-23 PROCEDURE — 77030010507 HC ADH SKN DERMBND J&J -B

## 2019-08-23 PROCEDURE — 70450 CT HEAD/BRAIN W/O DYE: CPT

## 2019-08-23 PROCEDURE — 0HQ1XZZ REPAIR FACE SKIN, EXTERNAL APPROACH: ICD-10-PCS | Performed by: EMERGENCY MEDICINE

## 2019-08-23 PROCEDURE — 99284 EMERGENCY DEPT VISIT MOD MDM: CPT | Performed by: EMERGENCY MEDICINE

## 2019-08-23 PROCEDURE — 75810000293 HC SIMP/SUPERF WND  RPR: Performed by: EMERGENCY MEDICINE

## 2019-08-23 NOTE — ED TRIAGE NOTES
Pt in via [facility] from [place] [facility] c/o hematoma and laceration to forehead after slip and fall. No loc. Fall was witnessed by staff.

## 2019-08-23 NOTE — ED NOTES
TRANSFER - OUT REPORT:    Verbal report given to [place] (name) on Bhargav Espino  being transferred to [facility]) for routine progression of care       Report consisted of patients Situation, Background, Assessment and   Recommendations(SBAR). Information from the following report(s) ED Summary was reviewed with the receiving nurse. Lines:       Opportunity for questions and clarification was provided.       Patient transported with:   Regina Allen

## 2019-08-23 NOTE — ED TRIAGE NOTES
Pt sent from [facility] for combative behavior due to advanced dementia, pt moved into [facility] yesterday

## 2019-08-23 NOTE — DISCHARGE INSTRUCTIONS
Patient Education      Contact primary care physician for adjustment of medications or addition of medications for dementia. Dementia: Care Instructions  Your Care Instructions    Dementia is a loss of mental skills that affects your daily life. It is different than the occasional trouble with memory that is part of aging. You may find it hard to remember things that you feel you should be able to remember. Or you may feel that your mind is just not working as well as usual.  Finding out that you have dementia is a shock. You may be afraid and worried about how the condition will change your life. Although there is no cure at this time, medicine may slow memory loss and improve thinking for a while. Other medicines may be able to help you sleep or cope with depression and behavior changes. Dementia often gets worse slowly. But it can get worse quickly. As dementia gets worse, it may become harder to do common things that take planning, like making a list and going shopping. Over time, the disease may make it hard for you to take care of yourself. Some people with dementia need others to help care for them. Dementia is different for everyone. You may be able to function well for a long time. In the early stage of the condition, you can do things at home to make life easier and safer. You also can keep doing your hobbies and other activities. Many people find comfort in planning now for their future needs. Follow-up care is a key part of your treatment and safety. Be sure to make and go to all appointments, and call your doctor if you are having problems. It's also a good idea to know your test results and keep a list of the medicines you take. How can you care for yourself at home? · Take your medicines exactly as prescribed. Call your doctor if you think you are having a problem with your medicine. · Eat healthy foods. Eat lots of whole grains, fruits, and vegetables every day.  If you are not hungry, try snacks or nutritional drinks such as Boost, Ensure, or Sustacal.  · If you have problems sleeping:  ? Try not to nap too close to your bedtime. ? Exercise regularly. Walking is a good choice. ? Try a glass of warm milk or caffeine-free herbal tea before bed. · Do tasks and activities during the time of day when you feel your best. It may help to develop a daily routine. · Post labels, lists, and sticky notes to help you remember things. Write your activities on a calendar you can easily find. Put your clock where you can easily see it. · Stay active. Take walks in familiar places, or with friends or loved ones. Try to stay active mentally too. Read and work crossword puzzles if you enjoy these activities. · Do not drive unless you can pass an on-road driving test. If you are not sure if you are safe to drive, your state 's license bureau can test you. · Keep a cordless phone and a flashlight with new batteries by your bed. If possible, put a phone in each of the main rooms of your house, or carry a cell phone in case you fall and cannot reach a phone. Or, you can wear a device around your neck or wrist. You push a button that sends a signal for help. Acknowledge your emotions and plan for the future  · Talk openly and honestly with your doctor. · Let yourself grieve. It is common to feel angry, scared, frustrated, anxious, or depressed. · Get emotional support from family, friends, a support group, or a counselor experienced in working with people who have dementia. · Ask for help if you need it. · Tell your doctor how you feel. You may feel upset, angry, or worried at times. Many things can cause this, including poor sleep, medicine side effects, confusion, and pain. Your doctor may be able to help you. · Plan for the future. ? Talk to your family and doctor about preparing a living will and other important papers while you can make decisions.  These papers tell your doctors how to care for you at the end of your life. ? Consider naming a person to make decisions about your care if you are not able to. When should you call for help? Call 911 anytime you think you may need emergency care. For example, call if:    · You are lost and do not know whom to call.     · You are injured and do not know whom to call.    Call your doctor now or seek immediate medical care if:    · You are more confused or upset than usual.     · You feel like you could hurt yourself because your mind is not working well.   Shea Griffith closely for changes in your health, and be sure to contact your doctor if you have any problems. Where can you learn more? Go to http://bryan-harsh.info/. Enter C250 in the search box to learn more about \"Dementia: Care Instructions. \"  Current as of: September 11, 2018  Content Version: 12.1  © 4476-7375 [employer]. Care instructions adapted under license by [employer] (which disclaims liability or warranty for this information). If you have questions about a medical condition or this instruction, always ask your healthcare professional. [place] [address] any warranty or liability for your use of this information. Patient Education        Helping A Person With Dementia: Care Instructions  Your Care Instructions    Dementia is a loss of mental skills that affects daily life. It is different from mild memory loss that occurs with aging. Dementia can cause problems with memory, thinking clearly, and planning. It is different for everyone. But it usually gets worse slowly. Some people who have dementia can function well for a long time. But at some point it may become hard for the person to care for himself or herself. It can be upsetting to learn that a loved one has this condition. You may be afraid and worried about what will happen. You may wonder how you will care for the person. There is no cure for dementia.  But medicine may be able to slow memory loss and improve thinking for a while. Other medicines may help with sleep, depression, and behavior changes. Dementia is different for everyone. In some cases, people can function well for a long time. You can help your loved one by making his or her home life easier and safer. You also need to take care of yourself. Caregiving can be stressful. But support is available to help you and give you a break when you need it. The Alzheimer's Association offers good information and support. If you are caring for someone with dementia, you can help make life safer and more comfortable. You can also help your loved one make decisions about future care. You may also want to bring up legal and financial issues. These are hard but important conversations to have. Follow-up care is a key part of your loved one's treatment and safety. Be sure to make and go to all appointments, and call your doctor if your loved one is having problems. It's also a good idea to know your loved one's test results and keep a list of the medicines he or she takes. How can you care for your loved one at home? Taking care of the person  · If the person takes medicine for dementia, help him or her take it exactly as prescribed. Call the doctor if you notice any problems with the medicine. · Make a list of the person's medicines. Review it with all of his or her doctors. · Help the person eat a balanced diet. Serve plenty of whole grains, fruits, and vegetables every day. If the person is not hungry at mealtimes, give snacks at midmorning and in the afternoon. Offer drinks such as Boost, Ensure, or Sustacal if the person is losing weight. · Encourage exercise. Walking and other activities may slow the decline of mental ability. Help the person stay active mentally with reading, crossword puzzles, or other hobbies. · Talk openly with the doctor about any behavior changes.  Many people who have dementia become easily upset or agitated or feel worried. There are many things that can cause this, such as medicine side effects, confusion, and pain. It may be helpful to:  ? Keep distractions to a minimum. It may also help to keep noise levels low and voices quiet. ? Develop simple daily routines for bathing, dressing, and other activities. And remind your loved one often about upcoming changes to the daily routine, such as trips or appointments. ? Ask what is upsetting him or her. Keep in mind that people who have dementia don't always know why they are upset. · Take steps to help if the person is sundowning. This is the restless behavior and trouble with sleeping that may occur in late afternoon and at night. Try not to let the person nap during the day. Offer a glass of warm milk or caffeine-free tea before bedtime. · Be patient. A task may take the person longer than it used to. · For as long as he or she is able, allow your loved one to make decisions about activities, food, clothing, and other choices. Let him or her be independent, even if tasks take more time or are not done perfectly. Tailor tasks to the person's abilities. For example, if cooking is no longer safe, ask for other help. Your loved one can help set the table, or make simple dishes such as a salad. When the person needs help, offer it gently. Staying safe  · Make your home (or your loved one's home) safe. Tack down rugs, and put no-slip tape in the tub. Install handrails, and put safety switches on stoves and appliances. Keep rooms free of clutter. Make sure walkways around furniture are clear. Do not move furniture around, because the person may become confused. · Use locks on doors and cupboards. Lock up knives, scissors, medicines, cleaning supplies, and other dangerous things. · Do not let the person drive or cook if he or she can't do it safely.  A person with dementia should not drive unless he or she is able to pass an on-road driving test. Your state 's license bureau can do a driving test if there is any question. · Get medical alert jewelry for the person so that you can be contacted if he or she wanders away. If possible, provide a safe place for wandering, such as an enclosed yard or garden. Taking care of yourself  · Ask your doctor about support groups and other resources in your area. · Take care of your health. Be sure to eat healthy foods and get enough rest and exercise. · Take time for yourself. Respite services provide someone to stay with the person for a short time while you get out of the house for a few hours. · Make time for an activity that you enjoy. Read, listen to music, paint, do crafts, or play an instrument, even if it's only for a few minutes a day. · Spend time with family, friends, and others in your support system. When should you call for help? Call 911 anytime you think the person may need emergency care. For example, call if:    · The person who has dementia wanders away and you can't find him or her.     · The person who has dementia is seriously injured.    Call the doctor now or seek immediate medical care if:    · The person suddenly sees things that are not there (hallucinates).     · The person has a sudden change in his or her behavior.    Watch closely for changes in the person's health, and be sure to contact the doctor if:    · The person has symptoms that could cause injury.     · The person has problems with his or her medicine.     · You need more information to care for a person with dementia.     · You need respite care so you can take a break. Where can you learn more? Go to http://bryan-harsh.info/. Enter Q784 in the search box to learn more about \"Helping A Person With Dementia: Care Instructions. \"  Current as of: September 11, 2018  Content Version: 12.1  © 0333-3271 [employer].  Care instructions adapted under license by [employer] (which disclaims liability or warranty for this information). If you have questions about a medical condition or this instruction, always ask your healthcare professional. [place] [address] any warranty or liability for your use of this information.

## 2019-08-23 NOTE — ED PROVIDER NOTES
Male sent to the ED for confusion. Patient has advanced dementia and is in a nursing home. Patient was recently moved to a different nursing home and that when his behavior got more combative. No fevers chills nausea vomiting or diarrhea reported. Altered mental status    This is a chronic problem. The current episode started 12 to 24 hours ago. The problem has not changed since onset. Associated symptoms include confusion and agitation. Risk factors include dementia. His past medical history is significant for dementia. Past Medical History:   Diagnosis Date    Psychiatric disorder     dementia       No past surgical history on file. No family history on file.     Social History     Socioeconomic History    Marital status: SINGLE     Spouse name: Not on file    Number of children: Not on file    Years of education: Not on file    Highest education level: Not on file   Occupational History    Not on file   Social Needs    Financial resource strain: Not on file    Food insecurity:     Worry: Not on file     Inability: Not on file    Transportation needs:     Medical: Not on file     Non-medical: Not on file   Tobacco Use    Smoking status: Not on file   Substance and Sexual Activity    Alcohol use: Not on file    Drug use: Not on file    Sexual activity: Not on file   Lifestyle    Physical activity:     Days per week: Not on file     Minutes per session: Not on file    Stress: Not on file   Relationships    Social connections:     Talks on phone: Not on file     Gets together: Not on file     Attends Quaker service: Not on file     Active member of club or organization: Not on file     Attends meetings of clubs or organizations: Not on file     Relationship status: Not on file    Intimate partner violence:     Fear of current or ex partner: Not on file     Emotionally abused: Not on file     Physically abused: Not on file     Forced sexual activity: Not on file   Other Topics Concern    Not on file   Social History Narrative    Not on file         ALLERGIES: Patient has no known allergies. Review of Systems   Constitutional: Negative. Negative for activity change. HENT: Negative. Eyes: Negative. Respiratory: Negative. Cardiovascular: Negative. Gastrointestinal: Negative. Genitourinary: Negative. Musculoskeletal: Negative. Skin: Negative. Neurological: Negative. Psychiatric/Behavioral: Positive for agitation and confusion. All other systems reviewed and are negative. Vitals:    08/22/19 2207 08/22/19 2210 08/22/19 2211 08/22/19 2257   BP: 150/71  150/71    Pulse:  75 70 64   Resp:   16    Temp:   97.9 °F (36.6 °C)    SpO2:   97%             Physical Exam   Constitutional: He appears well-developed and well-nourished. He appears listless. Non-toxic appearance. He does not have a sickly appearance. He does not appear ill. No distress. HENT:   Head: Normocephalic and atraumatic. Right Ear: External ear normal.   Left Ear: External ear normal.   Nose: Nose normal.   Mouth/Throat: Oropharynx is clear and moist. No oropharyngeal exudate. Eyes: Pupils are equal, round, and reactive to light. Conjunctivae and EOM are normal. Right eye exhibits no discharge. Left eye exhibits no discharge. No scleral icterus. Neck: Normal range of motion. Neck supple. No JVD present. No tracheal deviation present. Cardiovascular: Normal rate, regular rhythm and intact distal pulses. Pulmonary/Chest: Effort normal and breath sounds normal. No stridor. No respiratory distress. He has no wheezes. He exhibits no tenderness. Abdominal: Soft. Bowel sounds are normal. He exhibits no distension and no mass. There is no tenderness. Musculoskeletal: Normal range of motion. He exhibits no edema or tenderness. Neurological: He appears listless. He is disoriented. No cranial nerve deficit or sensory deficit. GCS eye subscore is 4. GCS verbal subscore is 4.  GCS motor subscore is 5. Skin: Skin is warm and dry. No rash noted. He is not diaphoretic. No erythema. No pallor. Psychiatric: Thought content normal. His affect is labile and inappropriate. He is agitated. Cognition and memory are impaired. He exhibits abnormal recent memory and abnormal remote memory. Nursing note and vitals reviewed. MDM  Number of Diagnoses or Management Options  Diagnosis management comments: Physical exam and laboratory data are all normal no signs of infection or other metabolic encephalopathy. Patient's advanced dementia recently changed his location nursing homes which may have brought about worsening of his confusion. We will have him return and primary care physician to follow closely make medication adjustments.        Amount and/or Complexity of Data Reviewed  Clinical lab tests: ordered and reviewed  Tests in the radiology section of CPT®: ordered and reviewed  Tests in the medicine section of CPT®: ordered and reviewed    Risk of Complications, Morbidity, and/or Mortality  Presenting problems: moderate  Diagnostic procedures: moderate  Management options: moderate           Procedures

## 2019-08-23 NOTE — ED PROVIDER NOTES
Patient is a nursing home resident with severe dementia and is a poor historian. He reportedly fell though no one is with him to provide any details. He sustained a laceration to his left forehead and was transported by EMS. No other history is available. Elements of this note were made using speech recognition software. As such, errors of speech recognition may occur. Past Medical History:   Diagnosis Date    Psychiatric disorder     dementia       No past surgical history on file. No family history on file.     Social History     Socioeconomic History    Marital status: SINGLE     Spouse name: Not on file    Number of children: Not on file    Years of education: Not on file    Highest education level: Not on file   Occupational History    Not on file   Social Needs    Financial resource strain: Not on file    Food insecurity:     Worry: Not on file     Inability: Not on file    Transportation needs:     Medical: Not on file     Non-medical: Not on file   Tobacco Use    Smoking status: Never Smoker    Smokeless tobacco: Never Used   Substance and Sexual Activity    Alcohol use: Not Currently    Drug use: Never    Sexual activity: Not on file   Lifestyle    Physical activity:     Days per week: Not on file     Minutes per session: Not on file    Stress: Not on file   Relationships    Social connections:     Talks on phone: Not on file     Gets together: Not on file     Attends Hinduism service: Not on file     Active member of club or organization: Not on file     Attends meetings of clubs or organizations: Not on file     Relationship status: Not on file    Intimate partner violence:     Fear of current or ex partner: Not on file     Emotionally abused: Not on file     Physically abused: Not on file     Forced sexual activity: Not on file   Other Topics Concern    Not on file   Social History Narrative    Not on file         ALLERGIES: Patient has no known allergies. Review of Systems   Unable to perform ROS: Dementia       Vitals:    08/23/19 1631 08/23/19 1633 08/23/19 1711   BP: 153/86 153/86    Pulse:  88    Resp:  18    Temp:  97.7 °F (36.5 °C)    SpO2:  96% 96%   Weight:  70.3 kg (154 lb 15.7 oz)    Height:  6' (1.829 m)             Physical Exam   Constitutional: He appears well-developed and well-nourished. HENT:   Head: Normocephalic. 1 cm linear laceration left forehead as indicated   Eyes: Pupils are equal, round, and reactive to light. Conjunctivae are normal.   Neck: Normal range of motion. Neck supple. Pulmonary/Chest: Effort normal. No stridor. Abdominal: Soft. He exhibits no distension. Musculoskeletal: Normal range of motion. He exhibits no edema. Neurological: He is alert. Oriented to person only   Skin: Skin is warm and dry. Nursing note and vitals reviewed. MDM  Number of Diagnoses or Management Options  Dementia without behavioral disturbance, unspecified dementia type:   Fall, initial encounter: new and does not require workup  Laceration of scalp without foreign body, initial encounter: new and does not require workup  Diagnosis management comments: 6:01 PM discussed results with patient, unremarkable head CT findings. Amount and/or Complexity of Data Reviewed  Tests in the radiology section of CPT®: reviewed    Risk of Complications, Morbidity, and/or Mortality  Presenting problems: moderate  Diagnostic procedures: moderate  Management options: moderate    Patient Progress  Patient progress: stable         Wound Repair  Date/Time: 8/23/2019 6:23 PM  Performed by: attendingPreparation: skin prepped with alcohol  Pre-procedure re-eval: Immediately prior to the procedure, the patient was reevaluated and found suitable for the planned procedure and any planned medications.   Time out: Immediately prior to the procedure a time out was called to verify the correct patient, procedure, equipment, staff and marking as appropriate. .  Location details: face  Wound length:2.5 cm or less  Foreign bodies: no foreign bodies  Irrigation solution: saline  Irrigation method: syringe  Debridement: none  Skin closure: glue  Approximation: close  Dressing: 4x4  Patient tolerance: Patient tolerated the procedure well with no immediate complications  My total time at bedside, performing this procedure was 1-15 minutes.

## 2019-08-26 ENCOUNTER — HOSPITAL ENCOUNTER (INPATIENT)
Age: 84
LOS: 4 days | Discharge: NURSING, REHAB OR LONG-TERM CARE | DRG: 683 | End: 2019-08-30
Attending: EMERGENCY MEDICINE | Admitting: INTERNAL MEDICINE
Payer: MEDICARE

## 2019-08-26 ENCOUNTER — APPOINTMENT (OUTPATIENT)
Dept: GENERAL RADIOLOGY | Age: 84
DRG: 683 | End: 2019-08-26
Attending: EMERGENCY MEDICINE
Payer: MEDICARE

## 2019-08-26 ENCOUNTER — APPOINTMENT (OUTPATIENT)
Dept: CT IMAGING | Age: 84
DRG: 683 | End: 2019-08-26
Attending: EMERGENCY MEDICINE
Payer: MEDICARE

## 2019-08-26 DIAGNOSIS — G30.9 ALZHEIMER'S DEMENTIA WITH BEHAVIORAL DISTURBANCE, UNSPECIFIED TIMING OF DEMENTIA ONSET: ICD-10-CM

## 2019-08-26 DIAGNOSIS — J69.0 ASPIRATION PNEUMONIA OF LOWER LOBE, UNSPECIFIED ASPIRATION PNEUMONIA TYPE, UNSPECIFIED LATERALITY (HCC): ICD-10-CM
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
## 2019-08-26 DIAGNOSIS — N17.9 AKI (ACUTE KIDNEY INJURY) (HCC): Primary | ICD-10-CM

## 2019-08-26 DIAGNOSIS — S01.81XA FACIAL LACERATION, INITIAL ENCOUNTER: ICD-10-CM

## 2019-08-26 DIAGNOSIS — F02.81 ALZHEIMER'S DEMENTIA WITH BEHAVIORAL DISTURBANCE, UNSPECIFIED TIMING OF DEMENTIA ONSET: ICD-10-CM

## 2019-08-26 PROBLEM — Z79.52 LONG TERM SYSTEMIC STEROID USER: Status: ACTIVE | Noted: 2019-08-26

## 2019-08-26 PROBLEM — D72.829 LEUKOCYTOSIS: Status: ACTIVE | Noted: 2019-08-26
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
## 2019-08-26 LAB
ALBUMIN SERPL-MCNC: 2.8 G/DL (ref 3.2–4.6)
ALBUMIN/GLOB SERPL: 0.7 {RATIO} (ref 1.2–3.5)
ALP SERPL-CCNC: 58 U/L (ref 50–136)
ALT SERPL-CCNC: 14 U/L (ref 12–65)
ANION GAP SERPL CALC-SCNC: 7 MMOL/L (ref 7–16)
APPEARANCE UR: CLEAR
AST SERPL-CCNC: 30 U/L (ref 15–37)
BACTERIA URNS QL MICRO: ABNORMAL /HPF
BASOPHILS # BLD: 0 K/UL (ref 0–0.2)
BASOPHILS NFR BLD: 0 % (ref 0–2)
BILIRUB SERPL-MCNC: 0.8 MG/DL (ref 0.2–1.1)
BILIRUB UR QL: NEGATIVE
BUN SERPL-MCNC: 57 MG/DL (ref 8–23)
CALCIUM SERPL-MCNC: 8.6 MG/DL (ref 8.3–10.4)
CHLORIDE SERPL-SCNC: 105 MMOL/L (ref 98–107)
CO2 SERPL-SCNC: 28 MMOL/L (ref 21–32)
COLOR UR: YELLOW
CREAT SERPL-MCNC: 2.22 MG/DL (ref 0.8–1.5)
DIFFERENTIAL METHOD BLD: ABNORMAL
EOSINOPHIL # BLD: 0 K/UL (ref 0–0.8)
EOSINOPHIL NFR BLD: 0 % (ref 0.5–7.8)
EPI CELLS #/AREA URNS HPF: ABNORMAL /HPF
ERYTHROCYTE [DISTWIDTH] IN BLOOD BY AUTOMATED COUNT: 16 % (ref 11.9–14.6)
GLOBULIN SER CALC-MCNC: 3.8 G/DL (ref 2.3–3.5)
GLUCOSE SERPL-MCNC: 101 MG/DL (ref 65–100)
GLUCOSE UR STRIP.AUTO-MCNC: NEGATIVE MG/DL
HCT VFR BLD AUTO: 32.1 % (ref 41.1–50.3)
HGB BLD-MCNC: 10.1 G/DL (ref 13.6–17.2)
HGB UR QL STRIP: ABNORMAL
IMM GRANULOCYTES # BLD AUTO: 0.1 K/UL (ref 0–0.5)
IMM GRANULOCYTES NFR BLD AUTO: 1 % (ref 0–5)
KETONES UR QL STRIP.AUTO: NEGATIVE MG/DL
LACTATE BLD-SCNC: 1.07 MMOL/L (ref 0.5–1.9)
LEUKOCYTE ESTERASE UR QL STRIP.AUTO: ABNORMAL
LYMPHOCYTES # BLD: 1.5 K/UL (ref 0.5–4.6)
LYMPHOCYTES NFR BLD: 8 % (ref 13–44)
MAGNESIUM SERPL-MCNC: 2.4 MG/DL (ref 1.8–2.4)
MCH RBC QN AUTO: 30.1 PG (ref 26.1–32.9)
MCHC RBC AUTO-ENTMCNC: 31.5 G/DL (ref 31.4–35)
MCV RBC AUTO: 95.8 FL (ref 79.6–97.8)
MONOCYTES # BLD: 0.8 K/UL (ref 0.1–1.3)
MONOCYTES NFR BLD: 4 % (ref 4–12)
NEUTS SEG # BLD: 17 K/UL (ref 1.7–8.2)
NEUTS SEG NFR BLD: 87 % (ref 43–78)
NITRITE UR QL STRIP.AUTO: NEGATIVE
NRBC # BLD: 0 K/UL (ref 0–0.2)
OTHER OBSERVATIONS,UCOM: ABNORMAL
PH UR STRIP: 5.5 [PH] (ref 5–9)
PLATELET # BLD AUTO: 204 K/UL (ref 150–450)
PMV BLD AUTO: 10.9 FL (ref 9.4–12.3)
POTASSIUM SERPL-SCNC: 4 MMOL/L (ref 3.5–5.1)
PROT SERPL-MCNC: 6.6 G/DL (ref 6.3–8.2)
PROT UR STRIP-MCNC: 100 MG/DL
RBC # BLD AUTO: 3.35 M/UL (ref 4.23–5.6)
RBC #/AREA URNS HPF: ABNORMAL /HPF
SODIUM SERPL-SCNC: 140 MMOL/L (ref 136–145)
SP GR UR REFRACTOMETRY: >1.03 (ref 1–1.02)
UROBILINOGEN UR QL STRIP.AUTO: 0.2 EU/DL (ref 0.2–1)
WBC # BLD AUTO: 19.4 K/UL (ref 4.3–11.1)
WBC URNS QL MICRO: ABNORMAL /HPF

## 2019-08-26 PROCEDURE — 92610 EVALUATE SWALLOWING FUNCTION: CPT

## 2019-08-26 PROCEDURE — 77030040361 HC SLV COMPR DVT MDII -B

## 2019-08-26 PROCEDURE — 77030019605

## 2019-08-26 PROCEDURE — 70450 CT HEAD/BRAIN W/O DYE: CPT

## 2019-08-26 PROCEDURE — 74011000258 HC RX REV CODE- 258: Performed by: INTERNAL MEDICINE

## 2019-08-26 PROCEDURE — 65270000029 HC RM PRIVATE

## 2019-08-26 PROCEDURE — 97530 THERAPEUTIC ACTIVITIES: CPT

## 2019-08-26 PROCEDURE — 72170 X-RAY EXAM OF PELVIS: CPT

## 2019-08-26 PROCEDURE — 83735 ASSAY OF MAGNESIUM: CPT

## 2019-08-26 PROCEDURE — 74011250636 HC RX REV CODE- 250/636: Performed by: INTERNAL MEDICINE

## 2019-08-26 PROCEDURE — 74011000302 HC RX REV CODE- 302: Performed by: INTERNAL MEDICINE

## 2019-08-26 PROCEDURE — 83605 ASSAY OF LACTIC ACID: CPT

## 2019-08-26 PROCEDURE — 81003 URINALYSIS AUTO W/O SCOPE: CPT

## 2019-08-26 PROCEDURE — 99285 EMERGENCY DEPT VISIT HI MDM: CPT | Performed by: EMERGENCY MEDICINE

## 2019-08-26 PROCEDURE — 75810000293 HC SIMP/SUPERF WND  RPR: Performed by: EMERGENCY MEDICINE

## 2019-08-26 PROCEDURE — 87040 BLOOD CULTURE FOR BACTERIA: CPT

## 2019-08-26 PROCEDURE — 87086 URINE CULTURE/COLONY COUNT: CPT

## 2019-08-26 PROCEDURE — 97165 OT EVAL LOW COMPLEX 30 MIN: CPT

## 2019-08-26 PROCEDURE — 96365 THER/PROPH/DIAG IV INF INIT: CPT | Performed by: EMERGENCY MEDICINE

## 2019-08-26 PROCEDURE — 77030011943

## 2019-08-26 PROCEDURE — 77030002916 HC SUT ETHLN J&J -A: Performed by: EMERGENCY MEDICINE

## 2019-08-26 PROCEDURE — 74011250636 HC RX REV CODE- 250/636: Performed by: EMERGENCY MEDICINE

## 2019-08-26 PROCEDURE — 73552 X-RAY EXAM OF FEMUR 2/>: CPT

## 2019-08-26 PROCEDURE — 86580 TB INTRADERMAL TEST: CPT | Performed by: INTERNAL MEDICINE

## 2019-08-26 PROCEDURE — 85025 COMPLETE CBC W/AUTO DIFF WBC: CPT

## 2019-08-26 PROCEDURE — 74011636637 HC RX REV CODE- 636/637: Performed by: INTERNAL MEDICINE

## 2019-08-26 PROCEDURE — 0HQ1XZZ REPAIR FACE SKIN, EXTERNAL APPROACH: ICD-10-PCS | Performed by: EMERGENCY MEDICINE

## 2019-08-26 PROCEDURE — 71046 X-RAY EXAM CHEST 2 VIEWS: CPT

## 2019-08-26 PROCEDURE — 81003 URINALYSIS AUTO W/O SCOPE: CPT | Performed by: EMERGENCY MEDICINE

## 2019-08-26 PROCEDURE — 80053 COMPREHEN METABOLIC PANEL: CPT

## 2019-08-26 RX ORDER — LANOLIN ALCOHOL/MO/W.PET/CERES
500 CREAM (GRAM) TOPICAL DAILY
COMMUNITY

## 2019-08-26 RX ORDER — PREDNISONE 10 MG/1
10 TABLET ORAL
Status: DISCONTINUED | OUTPATIENT
Start: 2019-08-26 | End: 2019-08-30 | Stop reason: HOSPADM

## 2019-08-26 RX ORDER — VANCOMYCIN/0.9 % SOD CHLORIDE 1.5G/250ML
1500 PLASTIC BAG, INJECTION (ML) INTRAVENOUS ONCE
Status: COMPLETED | OUTPATIENT
Start: 2019-08-26 | End: 2019-08-27

## 2019-08-26 RX ORDER — LANOLIN ALCOHOL/MO/W.PET/CERES
3 CREAM (GRAM) TOPICAL
COMMUNITY
End: 2019-08-30

## 2019-08-26 RX ORDER — ONDANSETRON 2 MG/ML
4 INJECTION INTRAMUSCULAR; INTRAVENOUS
Status: DISCONTINUED | OUTPATIENT
Start: 2019-08-26 | End: 2019-08-30 | Stop reason: HOSPADM

## 2019-08-26 RX ORDER — TRAZODONE HYDROCHLORIDE 50 MG/1
50 TABLET ORAL
COMMUNITY
End: 2019-08-30

## 2019-08-26 RX ORDER — DOCUSATE SODIUM 100 MG/1
100 CAPSULE, LIQUID FILLED ORAL
Status: DISCONTINUED | OUTPATIENT
Start: 2019-08-26 | End: 2019-08-30 | Stop reason: HOSPADM

## 2019-08-26 RX ORDER — SODIUM CHLORIDE 0.9 % (FLUSH) 0.9 %
5-40 SYRINGE (ML) INJECTION AS NEEDED
Status: DISCONTINUED | OUTPATIENT
Start: 2019-08-26 | End: 2019-08-30 | Stop reason: HOSPADM

## 2019-08-26 RX ORDER — SODIUM CHLORIDE 9 MG/ML
75 INJECTION, SOLUTION INTRAVENOUS CONTINUOUS
Status: DISCONTINUED | OUTPATIENT
Start: 2019-08-26 | End: 2019-08-28

## 2019-08-26 RX ORDER — LEVOFLOXACIN 5 MG/ML
500 INJECTION, SOLUTION INTRAVENOUS
Status: COMPLETED | OUTPATIENT
Start: 2019-08-26 | End: 2019-08-26

## 2019-08-26 RX ORDER — PANTOPRAZOLE SODIUM 40 MG/1
40 TABLET, DELAYED RELEASE ORAL
Status: DISCONTINUED | OUTPATIENT
Start: 2019-08-27 | End: 2019-08-30 | Stop reason: HOSPADM

## 2019-08-26 RX ORDER — ACETAMINOPHEN 325 MG/1
650 TABLET ORAL
Status: DISCONTINUED | OUTPATIENT
Start: 2019-08-26 | End: 2019-08-30 | Stop reason: HOSPADM

## 2019-08-26 RX ORDER — SODIUM CHLORIDE 0.9 % (FLUSH) 0.9 %
5-40 SYRINGE (ML) INJECTION EVERY 8 HOURS
Status: DISCONTINUED | OUTPATIENT
Start: 2019-08-26 | End: 2019-08-30 | Stop reason: HOSPADM

## 2019-08-26 RX ADMIN — PREDNISONE 10 MG: 10 TABLET ORAL at 10:02

## 2019-08-26 RX ADMIN — SODIUM CHLORIDE 75 ML/HR: 900 INJECTION, SOLUTION INTRAVENOUS at 08:37

## 2019-08-26 RX ADMIN — TUBERCULIN PURIFIED PROTEIN DERIVATIVE 5 UNITS: 5 INJECTION, SOLUTION INTRADERMAL at 10:04

## 2019-08-26 RX ADMIN — VANCOMYCIN HYDROCHLORIDE 1500 MG: 10 INJECTION, POWDER, LYOPHILIZED, FOR SOLUTION INTRAVENOUS at 10:29

## 2019-08-26 RX ADMIN — PIPERACILLIN SODIUM,TAZOBACTAM SODIUM 3.38 G: 3; .375 INJECTION, POWDER, FOR SOLUTION INTRAVENOUS at 17:08

## 2019-08-26 RX ADMIN — PIPERACILLIN SODIUM,TAZOBACTAM SODIUM 3.38 G: 3; .375 INJECTION, POWDER, FOR SOLUTION INTRAVENOUS at 10:29

## 2019-08-26 RX ADMIN — LEVOFLOXACIN 500 MG: 5 INJECTION, SOLUTION INTRAVENOUS at 06:49

## 2019-08-26 RX ADMIN — Medication 10 ML: at 10:05

## 2019-08-26 RX ADMIN — Medication 10 ML: at 22:24

## 2019-08-26 NOTE — PROGRESS NOTES
TRANSFER - IN REPORT:    Verbal report received from Jessica(name) on James Lewis  being received from ER(unit) for routine progression of care      Report consisted of patients Situation, Background, Assessment and   Recommendations(SBAR). Information from the following report(s) SBAR, ED Summary, Intake/Output and Recent Results was reviewed with the receiving nurse. Opportunity for questions and clarification was provided.

## 2019-08-26 NOTE — ED TRIAGE NOTES
Pt came by ems from [place] for fall, pt has dementia and is at his baseline per staff, lac above left eye and small lac to bridge of nose

## 2019-08-26 NOTE — PROGRESS NOTES
08/26/19 0858   Dual Skin Pressure Injury Assessment   Dual Skin Pressure Injury Assessment X  (laceration to left eye/skin tears on bilateral elbows)   Second Care Provider (Based on [address] [place] [address]) Billy Katz RN

## 2019-08-26 NOTE — PROGRESS NOTES
Care Management Interventions  PCP Verified by CM: Yes  Mode of Transport at Discharge: BLS  Transition of Care Consult (CM Consult): Assisted Living  Current Support Network: Assisted Living([facility])  Confirm Follow Up Transport: Family  Plan discussed with Pt/Family/Caregiver: Yes  Freedom of Choice Offered: Yes  Discharge Location  Discharge Placement: Assisted Living  Visited with pt regarding plans for discharge, pt has Dementia and is a poor historian. I spoke with dtr/Tashia (ENEIDA) she lives in [place]. States that pt was @ [facility] form 3/29-7/18, became more combative and was transferred to [facility] from 7/18-8/21. He was unable to return to [facility], so was moved to [facility], were they have a locked Dementia Unit. He has been there since 8/21. Has fallen twice in the last week per dtr, and at least 3 times the week prior. She feels that it is r/t the PRN Ativan that he has been receiving Q4 hrs for agitation. Staff states that he needs \"sopmething\" the ativan does make him drowsy, but they do not feel it it truly helping with his behavior. Will continue to follow.

## 2019-08-26 NOTE — ED PROVIDER NOTES
Patient is an 80-year-old male who presented to the emergency department today after a fall this morning when he got up to go to the bathroom. Review of the patient's medical record shows he has had frequent visits to the emergency department over the last month but no other visits prior to that. The patient has no complaints at this time however due to his advanced dementia he only answers very simple questions and is unable to provide us with much information. Past Medical History:   Diagnosis Date    Psychiatric disorder     dementia       No past surgical history on file. No family history on file.     Social History     Socioeconomic History    Marital status: SINGLE     Spouse name: Not on file    Number of children: Not on file    Years of education: Not on file    Highest education level: Not on file   Occupational History    Not on file   Social Needs    Financial resource strain: Not on file    Food insecurity:     Worry: Not on file     Inability: Not on file    Transportation needs:     Medical: Not on file     Non-medical: Not on file   Tobacco Use    Smoking status: Never Smoker    Smokeless tobacco: Never Used   Substance and Sexual Activity    Alcohol use: Not Currently    Drug use: Never    Sexual activity: Not on file   Lifestyle    Physical activity:     Days per week: Not on file     Minutes per session: Not on file    Stress: Not on file   Relationships    Social connections:     Talks on phone: Not on file     Gets together: Not on file     Attends Yazdanism service: Not on file     Active member of club or organization: Not on file     Attends meetings of clubs or organizations: Not on file     Relationship status: Not on file    Intimate partner violence:     Fear of current or ex partner: Not on file     Emotionally abused: Not on file     Physically abused: Not on file     Forced sexual activity: Not on file   Other Topics Concern    Not on file   Social History Narrative    Not on file         ALLERGIES: Patient has no known allergies. Review of Systems   Unable to perform ROS: Dementia       Vitals:    08/26/19 0506 08/26/19 0622 08/26/19 0624 08/26/19 0633   BP: 100/58 137/78     Pulse: (!) 59      Resp: 16      Temp: 98 °F (36.7 °C)      SpO2: 95%  97% 96%   Weight: 69.9 kg (154 lb)      Height: 6' (1.829 m)               Physical Exam     GENERAL:The patient is well nourished, and well-hydrated. No acute distress  VITAL SIGNS: Heart rate, blood pressure, respiratory rate reviewed as recorded in  nurse's notes  HEAD: negative bonilla and raccoon sign. Patient has a laceration to the left forehead over the left eyebrow. He has abrasions to the bridge of the nose. EYES: Pupils reactive. Extraocular motion intact. No conjunctival redness or drainage. EARS: No external masses or lesions. External auditory canals are clear with no  hemotympanum  NOSE: No nasal drainage or epistaxis. No septal hematoma noted  MOUTH/THROAT: Pharynx clear; airway patent. No loose dentition or intraoral  lacerations. Floor the mouth is soft. NECK: Supple, no meningeal signs. Trachea midline. No masses or thyromegaly. C-  collar in place No step-off deformities noted  LUNGS: Breath sounds clear and equal bilaterally no accessory muscle use  CHEST: No deformity, no crepitus. CARDIOVASCULAR: Regular rate and rhythm  ABDOMEN: Soft without tenderness. No palpable masses or organomegaly. No  peritoneal signs. No rigidity. PELVIS: stable no laxity  EXTREMITIES: No clubbing or cyanosis. No joint swelling. Normal muscle tone. No  restricted range of motion appreciated. Patient indicated some pain with internal/external rotation of the left leg at the distal femur. No obvious deformity appreciated or acute injury. He does have an abrasion over the left knee which is healing. NEUROLOGIC: Sensation is grossly intact.  Cranial nerve exam reveals face is  symmetrical, tongue is midline speech is clear. SKIN: No rash or petechiae. Good skin turgor palpated. PSYCHIATRIC: Alert but does not answer questions due to advanced dementia. MDM  Number of Diagnoses or Management Options  Diagnosis management comments: TIA, CVA, intracranial hemorrhage, ischemic stroke, brain tumor, Bell's palsy,    tension headache, migraine headache,    meningitis, encephalitis,    seizure, pseudoseizures, status epilepticus, malingering    peripheral neuropathy, metabolic disorder, Guillian Washington syndrome, multiple sclerosis,    electrolyte abnormality    MEDICAL    DVT, Thrombophlebitis, venous stasis, varicose veins, peripheral vascular disease, peripheral edema, peripheral arterial disease, arterial occlusion, peripheral neuropathy. .. TRAUMA  Sprain, strain, tendon injury, contusion,    Abrasion, laceration, neurovascular injury, foreign body    Fracture, open fracture, dislocation, joint separation, articular surface injury,         Amount and/or Complexity of Data Reviewed  Clinical lab tests: ordered and reviewed  Tests in the radiology section of CPT®: ordered and reviewed  Tests in the medicine section of CPT®: reviewed and ordered  Review and summarize past medical records: yes  Independent visualization of images, tracings, or specimens: yes      ED Course as of Aug 26 0712   Mon Aug 26, 2019   0532 Case discussed with patients daughter Meli Martínez who feels as though his falls maybe related to the patient's increased Ativan dosing since he changed his living facility. The patient does have a history of behavioral disturbances with dementia and gets aggressive with staff. Daughter states that yesterday she was called because the patient got choked up on his food and EMS did see the patient at the facility and were able to get him to clear however he does have a history of aspiration. He was recently treated for aspiration pneumonia. [KH]   1598 IMPRESSION:    No acute intracranial abnormalities.    CT HEAD   WITHOUT CONTRAST []   3203 IMPRESSION: COPD with interstitial fibrosis. No acute abnormalities   XR CHEST PA LAT []   5322 Dr. Felecia Doe with the hospitalist was consulted and they will have met the patient to the hospital.  He was started on IV antibiotics and IV fluids secondary to his acute kidney injury with clinical pneumonia. [KH]      ED Course User Index  [] Perlita DO Iram       Wound Repair  Date/Time: 8/26/2019 6:41 AM  Performed by: attendingPreparation: skin prepped with Shur-Clens  Time out: Immediately prior to the procedure a time out was called to verify the correct patient, procedure, equipment, staff and marking as appropriate. .  Location details: face  Wound length:2.6 - 7.5 cm  Anesthesia: local infiltration    Anesthesia:  Local Anesthetic: lidocaine 1% without epinephrine  Foreign bodies: no foreign bodies  Irrigation solution: saline  Irrigation method: syringe  Debridement: none  Skin closure: 5-0 nylon  Number of sutures: 6  Technique: simple and interrupted  Approximation: close

## 2019-08-26 NOTE — PROGRESS NOTES
SPEECH LANGUAGE PATHOLOGY: DYSPHAGIA- Initial Assessment and Discharge    NAME/AGE/GENDER: uJde Negrete is a 80 y.o. male  DATE: 8/26/2019  PRIMARY DIAGNOSIS: [facility]) [facility] [N17.9]      ICD-10: Treatment Diagnosis: R13.11 Dysphagia, Oral Phase    INTERDISCIPLINARY COLLABORATION: Registered Nurse  PRECAUTIONS/ALLERGIES: Patient has no known allergies. SUBJECTIVE   Patient alert, pleasantly confused. Edentulous. No family at bedside. RN reports coughing with noontime meals     Diet Prior to Evaluation: GI soft     History of Present Injury/Illness: Mr. Artur Patricio  has a past medical history of Psychiatric disorder. Marilynn Nova He also  has no past surgical history on file. Previous Dysphagia: NONE REPORTED    Problem List:  (Impairments causing functional limitations):  1. Oral dysphagia    Orientation:   Person  Unable to state place, situation, or time. Dementia at [facility]     Pain: Pain Scale 1: Numeric (0 - 10)  Pain Intensity 1: 0         OBJECTIVE   Oral Motor Assessment:  · Labial: No impairment  · Dentition: Edentulous  · Oral Hygiene: Adequate  · Lingual: No impairment  Limited oral motor assessment given impaired command following, but no gross asymmetries observed. Swallow assessment:   Patient presented with thin liquids via tsp, cup, and straw; puree; mixed; and solid textures. Edentulous- unsure if patient wears dentures at baseline, but none present in room at time of evaluation. Difficulty with mastication of tough textures resulting in strong cough 1/3 trials. Soft chewables tolerated without difficulty. Adequate oral clearing. Serial sips of thin liquids via straw tolerated without overt s/sx of airway compromise. ASSESSMENT   Patient presents with mild oral dysphagia due to missing dentition. Demonstrates ability to tolerate soft chewable textures without overt s/sx of airway compromise. Recommend mechanical soft diet/thin liquids. Medications as tolerated.  No further ST indicated at this time. Please consider re-consult if new concerns arise. Tool Used: Dysphagia Outcome and Severity Scale (ABIMAEL)    Score Comments   Normal Diet  [] 7 With no strategies or extra time needed   Functional Swallow  [] 6 May have mild oral or pharyngeal delay   Mild Dysphagia  [] 5 Which may require one diet consistency restricted    Mild-Moderate Dysphagia  [] 4 With 1-2 diet consistencies restricted   Moderate Dysphagia  [] 3 With 2 or more diet consistencies restricted   Moderate-Severe Dysphagia  [] 2 With partial PO strategies (trials with ST only)   Severe Dysphagia  [] 1 With inability to tolerate any PO safely      Score:  Initial: 5 Most Recent: x (Date 08/26/19 )   Interpretation of Tool: The Dysphagia Outcome and Severity Scale (ABIMAEL) is a simple, easy-to-use, 7-point scale developed to systematically rate the functional severity of dysphagia based on objective assessment and make recommendations for diet level, independence level, and type of nutrition. Current Medications:   No current facility-administered medications on file prior to encounter. Current Outpatient Medications on File Prior to Encounter   Medication Sig Dispense Refill    traZODone (DESYREL) 50 mg tablet Take 50 mg by mouth nightly.  melatonin 3 mg tablet Take 3 mg by mouth nightly.  cyanocobalamin (VITAMIN B12) 500 mcg tablet Take 500 mcg by mouth daily.  predniSONE (DELTASONE) 10 mg tablet Take 10 mg by mouth daily (with breakfast).  OLANZapine (ZYPREXA) 10 mg tablet Take 5 mg by mouth nightly.  acetaminophen (TYLENOL) 325 mg tablet Take 650 mg by mouth every eight (8) hours as needed for Pain.  omeprazole (PRILOSEC) 20 mg capsule Take 20 mg by mouth daily.  LORazepam (ATIVAN) 1 mg tablet Take 1 mg by mouth every four (4) hours as needed for Anxiety.            PLAN    FREQUENCY/DURATION: No further speech therapy indicated at this time.     - Recommendations for next treatment session: No additional speech therapy indicated at this time. REHABILITATION POTENTIAL FOR STATED GOALS: Good         RECOMMENDATIONS   DIET:    PO:  Mechanical soft   Liquids:  regular thin    MEDICATIONS: With liquid     ASPIRATION PRECAUTIONS  · Slow rate of intake  · Small bites/sips  · Upright at 90 degrees during meal     COMPENSATORY STRATEGIES/MODIFICATIONS  · Alternate liquids/solids     EDUCATION:  · Recommendations discussed with Nursing  · Patient     RECOMMENDATIONS for CONTINUED SPEECH THERAPY:   No further speech therapy indicated at this time.        SAFETY:  After treatment position/precautions:  · Upright in bed  · RN notified      Total Treatment Duration:   Time In: 6797  Time Out: Lila Reich 892, [address] CCC-SLP

## 2019-08-26 NOTE — PROGRESS NOTES
Pharmacokinetic Consult to Pharmacist    Gilles Madden is a 80 y.o. male being treated for sepsis of unknown etiology with vancomycin. Height: 6' (182.9 cm)  Weight: 69.9 kg (154 lb)  Lab Results   Component Value Date/Time    BUN 57 (H) 08/26/2019 05:19 AM    Creatinine 2.22 (H) 08/26/2019 05:19 AM    WBC 19.4 (H) 08/26/2019 05:19 AM    Lactic Acid (POC) 1.07 08/26/2019 06:16 AM      Estimated Creatinine Clearance: 23.2 mL/min (A) (based on SCr of 2.22 mg/dL (H)). CULTURES:  Results     Procedure Component Value Units Date/Time    CULTURE, URINE [[id number]]     Order Status:  Sent Specimen:  Urine from Clean catch     CULTURE, BLOOD [[id number]] Collected:  08/26/19 0616    Order Status:  Completed Specimen:  Blood Updated:  08/26/19 0637    CULTURE, BLOOD [[id number]] Collected:  08/26/19 0616    Order Status:  Completed Specimen:  Blood Updated:  08/26/19 0637                Day 1 of vancomycin. Goal trough is 15-20. We will initiate therapy with vancomycin 1500mg x 1 followed by 1g q36h. We will continue to follow the patient and adjust the dose as necessary per guidelines.     Thank you,  Donte Gong, SteveD

## 2019-08-26 NOTE — PROGRESS NOTES
OCCUPATIONAL THERAPY: Initial Assessment, Daily Note and Discharge 8/26/2019  INPATIENT:    [place] / [place] / Product Type: Medicare /      NAME/AGE/GENDER: Doris Tolbert is a 80 y.o. male   PRIMARY DIAGNOSIS:  ROSA (acute kidney injury) ([facility] [N17.9] ROSA (acute kidney injury) ([facility]   ROSA (acute kidney injury) ([facility]          ICD-10: Treatment Diagnosis:    Generalized Muscle Weakness (M62.81)  Other lack of cordination (R27.8)  Difficulty in walking, Not elsewhere classified (R26.2)   Precautions/Allergies:     Patient has no known allergies. ASSESSMENT:     Mr. Ernst Whitley presents supine in bed with alarm on due to dementia. He is able to follow simple 1 step commands but is disorientated x 4. He is CGA/min assist for bed mobility and SPT. [facility] at [facility]. He is at his functional baseline at this time and will need supervision and assist at d/c. This section established at most recent assessment   PROBLEM LIST (Impairments causing functional limitations):  Decreased Strength  Decreased Transfer Abilities   INTERVENTIONS PLANNED: (Benefits and precautions of occupational therapy have been discussed with the patient.)  Activities of daily living training  Therapeutic activity     TREATMENT PLAN: Frequency/Duration: Follow patient Eval, 1 tx, and d/c to address above goals. Rehabilitation Potential For Stated Goals: [facility] (at time of discharge pending progress):    Placement: It is my opinion, based on this patient's performance to date, that Mr. Ernst Whitley may benefit from being discharged with NO further skilled therapy due to a proven ability to function at baseline. Equipment:   None at this time              OCCUPATIONAL PROFILE AND HISTORY:   History of Present Injury/Illness (Reason for Referral):  See H&P  Past Medical History/Comorbidities:   Mr. Ernst Whitley  has a past medical history of Psychiatric disorder.   Mr. Ernst Whitley  has no past surgical history on file. Social History/Living Environment:   Home Environment: [address] [place] [address] Road [facility]: [facility]  # Steps to Enter: 0  Living Alone: No  Support Systems: Assisted living  Patient Expects to be Discharged to[de-identified] Assisted living  Tub or Shower Type: Shower  Prior Level of Function/Work/Activity:  Assisted Living. Dementia. Number of Personal Factors/Comorbidities that affect the Plan of Care: Brief history (0):  LOW COMPLEXITY   ASSESSMENT OF OCCUPATIONAL PERFORMANCE[de-identified]   Activities of Daily Living:   Basic ADLs (From Assessment) Complex ADLs (From Assessment)   Feeding: Setup  Oral Facial Hygiene/Grooming: Setup  Bathing: Contact guard assistance  Upper Body Dressing: Contact guard assistance  Lower Body Dressing: Contact guard assistance  Toileting: Contact guard assistance     Grooming/Bathing/Dressing Activities of Daily Living                             Bed/Mat Mobility  Supine to Sit: Contact guard assistance  Sit to Stand: Contact guard assistance  Stand to Sit: Contact guard assistance  Bed to Chair: Contact guard assistance  Scooting: Contact guard assistance     Most Recent Physical Functioning:   Gross Assessment:                  Posture:     Balance:  Sitting: Intact  Standing: Impaired Bed Mobility:  Supine to Sit: Contact guard assistance  Scooting: Contact guard assistance  Wheelchair Mobility:     Transfers:  Sit to Stand: Contact guard assistance  Stand to Sit: Contact guard assistance  Bed to Chair: Contact guard assistance            Patient Vitals for the past 6 hrs:   BP BP Patient Position SpO2 Pulse   08/26/19 0622 137/78 -- -- --   08/26/19 0624 -- -- 97 % --   08/26/19 0633 -- -- 96 % --   08/26/19 0728 128/50 -- 99 % --   08/26/19 0742 -- -- 93 % --   08/26/19 0758 -- -- 98 % --   08/26/19 0822 103/60 -- 98 % --   08/26/19 0859 111/65 Lying left side; At rest 95 % 80       Mental Status  Neurologic State: Confused  Orientation Level: Disoriented X4  Cognition: Follows commands, Impaired decision making                          Physical Skills Involved:  Range of Motion  Balance  Strength  Activity Tolerance Cognitive Skills Affected (resulting in the inability to perform in a timely and safe manner):  Perception  Executive Function  Sustained Attention  Divided Attention  Expression Psychosocial Skills Affected:  Habits/Routines  Environmental Adaptation  Social Interaction  Emotional Regulation   Number of elements that affect the Plan of Care: 3-5:  MODERATE COMPLEXITY   CLINICAL DECISION MAKIN[address] [place] [address] AM-PAC 6 Clicks   Daily Activity Inpatient Short Form  How much help from another person does the patient currently need. .. Total A Lot A Little None   1. Putting on and taking off regular lower body clothing? ? 1   ? 2   ? 3   ? 4   2. Bathing (including washing, rinsing, drying)? ? 1   ? 2   ? 3   ? 4   3. Toileting, which includes using toilet, bedpan or urinal?   ? 1   ? 2   ? 3   ? 4   4. Putting on and taking off regular upper body clothing? ? 1   ? 2   ? 3   ? 4   5. Taking care of personal grooming such as brushing teeth? ? 1   ? 2   ? 3   ? 4   6. Eating meals? ? 1   ? 2   ? 3   ? 4   © , Trustees of [address] [place] [address], under license to [employer]. All rights reserved      Score:  Initial: 15 Most Recent: X (Date: -- )    Interpretation of Tool:  Represents activities that are increasingly more difficult (i.e. Bed mobility, Transfers, Gait). Medical Necessity:     Skilled intervention continues to be required due to Deficit listed above. Use of outcome tool(s) and clinical judgement create a POC that gives a: MODERATE COMPLEXITY         TREATMENT:   (In addition to Assessment/Re-Assessment sessions the following treatments were rendered)     Pre-treatment Symptoms/Complaints:    Pain: Initial:   Pain Intensity 1: 0  Post Session:  0     Therapeutic Activity: (    10):   Therapeutic activities including Bed transfers and Chair transfers to improve mobility and balance. Required minimal   to promote motor control of bilateral, upper extremity(s), lower extremity(s). Initial evaluation 10 minutes.       Braces/Orthotics/Lines/Etc:   O2 Device: Room air  Treatment/Session Assessment:    Response to Treatment:  Fair  Interdisciplinary Collaboration:   Physical Therapist  Occupational Therapist  Registered Nurse  After treatment position/precautions:   Supine in bed  Bed alarm/tab alert on  Bed in low position  Call light within reach  Side rails x 3   Total Treatment Duration:  OT Patient Time In/Time Out  Time In: 1040  Time Out: [address], [place]

## 2019-08-26 NOTE — PROGRESS NOTES
Problem: Pressure Injury - Risk of  Goal: *Prevention of pressure injury  Description  Document Richy Scale and appropriate interventions in the flowsheet. Outcome: Progressing Towards Goal  Note:   Pressure Injury Interventions:  Sensory Interventions: Assess changes in LOC    Moisture Interventions: Absorbent underpads, Apply protective barrier, creams and emollients    Activity Interventions: Increase time out of bed    Mobility Interventions: Pressure redistribution bed/mattress (bed type)    Nutrition Interventions: Offer support with meals,snacks and hydration                     Problem: Falls - Risk of  Goal: *Absence of Falls  Description  Document Priya Fall Risk and appropriate interventions in the flowsheet.   Outcome: Progressing Towards Goal  Note:   Fall Risk Interventions:  Mobility Interventions: Bed/chair exit alarm    Mentation Interventions: Bed/chair exit alarm         Elimination Interventions: Call light in reach    History of Falls Interventions: Bed/chair exit alarm

## 2019-08-26 NOTE — ED NOTES
TRANSFER - OUT REPORT:    Verbal report given to [phone] on Ariane Ledesma  being transferred to [facility] for routine progression of care       Report consisted of patients Situation, Background, Assessment and   Recommendations(SBAR). Information from the following report(s) SBAR and MAR was reviewed with the receiving nurse. Lines:   Peripheral IV 08/26/19 Right Hand (Active)        Opportunity for questions and clarification was provided.       Patient transported with:

## 2019-08-26 NOTE — H&P
History and Physical    Patient: Day Alejandre MRN: [id number]  SSN: [id number]    YOB: 1932  Age: 80 y.o. Sex: male      Subjective: Day Alejandre is a 80 y.o. male who was brought to ER due to a fall and injury to his face today. Patient was getting up and was going to a bathroom. No report of lightheadedness. No report of slippery events. Patient fell on the floor from standing and hurt his face. No loss of consciousness. Patient could not provide much information now. No fever. No shaking. No chills. No chest pain. No shortness of breath. No fracture was found. Reportedly patient had 7 falls this month. He was found to have leukocytosis. Empiric IV antibiotics were started. Hospitalist service is requested to admit the patient. Past Medical History:   Diagnosis Date    Psychiatric disorder     dementia     No past surgical history on file. No family history on file. Social History     Tobacco Use    Smoking status: Never Smoker    Smokeless tobacco: Never Used   Substance Use Topics    Alcohol use: Not Currently      Prior to Admission medications    Medication Sig Start Date End Date Taking? Authorizing Provider   predniSONE (DELTASONE) 10 mg tablet Take 10 mg by mouth daily (with breakfast). Rona Guzmán MD   OLANZapine (ZYPREXA) 10 mg tablet Take 5 mg by mouth two (2) times a day. Rona Guzmán MD   acetaminophen (TYLENOL) 325 mg tablet Take 650 mg by mouth every eight (8) hours as needed for Pain. Rona Guzmán MD   omeprazole (PRILOSEC) 20 mg capsule Take 20 mg by mouth daily. Rona Guzmán MD   LORazepam (ATIVAN) 1 mg tablet Take 1 mg by mouth every four (4) hours as needed for Anxiety. Rona Guzmán MD OTHER Vitamin b12 take 2 tabs by mouth daily    Rona Guzmán MD        No Known Allergies    Review of Systems:    Constitutional: Negative for chills and fever. HENT: Negative for rhinorrhea and sore throat.     Eyes: Negative for pain, redness and visual disturbance. Respiratory: Negative for chest tightness, shortness of breath and wheezing. Cardiovascular: Negative for chest pain and leg swelling. Gastrointestinal: Negative for abdominal pain, bowel incontinence, diarrhea, nausea and vomiting. Genitourinary: Negative for bladder incontinence, dysuria and hematuria. Musculoskeletal: Negative for back pain, gait problem, neck pain and neck stiffness. Skin: Negative for color change and rash. Neurological: negative for speech difficulty. Negative for focal weakness, weakness, numbness, headaches and loss of balance. Psychiatric/Behavioral: Negative for agitation, confusion and + memory loss. Objective:     Vitals:    08/26/19 0742 08/26/19 0758 08/26/19 0822 08/26/19 0859   BP:   103/60 111/65   Pulse:    80   Resp:    16   Temp:    98 °F (36.7 °C)   SpO2: 93% 98% 98% 95%   Weight:       Height:            Physical Exam:    General:                    The patient is an elderly male in no acute respiratory distress. He is lying flat in bed. A sutured wound on his left side of face. Head:                                   Normocephalic/atraumatic. Eyes:                                   palpebral pallor, no scleral icterus. ENT:                                    External auricular and nasal exam within normal limits. Mucous membranes are moist.  Neck:                                   Supple, non-tender, no JVD. Lungs:                       Clear to auscultation bilaterally without wheezes or crackles. No respiratory distress or accessory muscle use. Heart:                                  Regular rate and rhythm, without murmurs, rubs, or gallops. Abdomen:                  Soft, non-tender, non-distended with normoactive bowel sounds. Genitourinary:           No tenderness over the bladder or bilateral CVAs.   Extremities: Without clubbing, cyanosis, or edema. Skin:                                    Normal color, texture, and turgor. No rashes, lesions, or jaundice. Pulses:                      Radial and dorsalis pedis pulses present 2+ bilaterally. Capillary refill <2s. Neurologic:                CN II-XII grossly intact and symmetrical.                                               Moving all four extremities well with no focal deficits. Psychiatric:                Pleasant demeanor, appropriate affect. Alert, confused. Lab and data     Recent Results (from the past 24 hour(s))   CBC WITH AUTOMATED DIFF    Collection Time: 08/26/19  5:19 AM   Result Value Ref Range    WBC 19.4 (H) 4.3 - 11.1 K/uL    RBC 3.35 (L) 4.23 - 5.6 M/uL    HGB 10.1 (L) 13.6 - 17.2 g/dL    HCT 32.1 (L) 41.1 - 50.3 %    MCV 95.8 79.6 - 97.8 FL    MCH 30.1 26.1 - 32.9 PG    MCHC 31.5 31.4 - 35.0 g/dL    RDW 16.0 (H) 11.9 - 14.6 %    PLATELET 588 707 - 672 K/uL    MPV 10.9 9.4 - 12.3 FL    ABSOLUTE NRBC 0.00 0.0 - 0.2 K/uL    DF AUTOMATED      NEUTROPHILS 87 (H) 43 - 78 %    LYMPHOCYTES 8 (L) 13 - 44 %    MONOCYTES 4 4.0 - 12.0 %    EOSINOPHILS 0 (L) 0.5 - 7.8 %    BASOPHILS 0 0.0 - 2.0 %    IMMATURE GRANULOCYTES 1 0.0 - 5.0 %    ABS. NEUTROPHILS 17.0 (H) 1.7 - 8.2 K/UL    ABS. LYMPHOCYTES 1.5 0.5 - 4.6 K/UL    ABS. MONOCYTES 0.8 0.1 - 1.3 K/UL    ABS. EOSINOPHILS 0.0 0.0 - 0.8 K/UL    ABS. BASOPHILS 0.0 0.0 - 0.2 K/UL    ABS. IMM.  GRANS. 0.1 0.0 - 0.5 K/UL   METABOLIC PANEL, COMPREHENSIVE    Collection Time: 08/26/19  5:19 AM   Result Value Ref Range    Sodium 140 136 - 145 mmol/L    Potassium 4.0 3.5 - 5.1 mmol/L    Chloride 105 98 - 107 mmol/L    CO2 28 21 - 32 mmol/L    Anion gap 7 7 - 16 mmol/L    Glucose 101 (H) 65 - 100 mg/dL    BUN 57 (H) 8 - 23 MG/DL    Creatinine 2.22 (H) 0.8 - 1.5 MG/DL    GFR est AA 36 (L) >60 ml/min/1.73m2    GFR est non-AA 30 (L) >60 ml/min/1.73m2    Calcium 8.6 8.3 - 10.4 MG/DL    Bilirubin, total 0.8 0.2 - 1.1 MG/DL    ALT (SGPT) 14 12 - 65 U/L    AST (SGOT) 30 15 - 37 U/L    Alk. phosphatase 58 50 - 136 U/L    Protein, total 6.6 6.3 - 8.2 g/dL    Albumin 2.8 (L) 3.2 - 4.6 g/dL    Globulin 3.8 (H) 2.3 - 3.5 g/dL    A-G Ratio 0.7 (L) 1.2 - 3.5     MAGNESIUM    Collection Time: 08/26/19  5:19 AM   Result Value Ref Range    Magnesium 2.4 1.8 - 2.4 mg/dL   POC LACTIC ACID    Collection Time: 08/26/19  6:16 AM   Result Value Ref Range    Lactic Acid (POC) 1.07 0.5 - 1.9 mmol/L     CT head  8-  IMPRESSION:     No acute intracranial abnormalities. XR chest   8-  IMPRESSION: COPD with interstitial fibrosis. No acute abnormalities. XR femur   8-  IMPRESSION:  Normal left femur.     ER pelvis   8-  IMPRESSION:     Unremarkable pelvis. Current Facility-Administered Medications:     sodium chloride (NS) flush 5-40 mL, 5-40 mL, IntraVENous, Q8H, Tyrell Richter MD    sodium chloride (NS) flush 5-40 mL, 5-40 mL, IntraVENous, PRN, Tyrell Richter MD    acetaminophen (TYLENOL) tablet 650 mg, 650 mg, Oral, Q6H PRN, Tyrell Richter MD    ondansetron (ZOFRAN) injection 4 mg, 4 mg, IntraVENous, Q6H PRN, Tyrell Richter MD    docusate sodium (COLACE) capsule 100 mg, 100 mg, Oral, DAILY PRN, Tyrell Richter MD    0.9% sodium chloride infusion, 75 mL/hr, IntraVENous, CONTINUOUS, Tyrell Richter MD, Stopped at 08/26/19 0910    EKG   8-  Normal sinus rhythm   Low voltage QRS   Non-specific ST-t wave changes     Assessment:     Hospital Problems  Never Reviewed          Codes Class Noted POA    Falls ICD-10-CM: W19. Avila Gisel  ICD-9-CM: D039.5  8/26/2019 Unknown        * (Principal) ROSA (acute kidney injury) ([facility] [address] ICD-10-CM: N17.9  ICD-9-CM: 584.9  8/26/2019 Unknown        Long term systemic steroid user ICD-10-CM: Z79.52  ICD-9-CM: V58.65  8/26/2019 Unknown          leukocytosis       Plan:     S/P falls   Multiple falls recently. No fracture  Need physical therapy to help with strength. Leukocytosis   Need to look for source of infection. Start Empiric IV antibiotics. Monitor. Long-term systemic steroid user  Will continue     ROSA   Monitor renal function and intake and output. Avoid nephrotoxic agents. IV fluid. I have discussed the plan of care with patient. Patient requires hospital stay as an in-patient and anticipated stay is more than 2 midnights due to the serious nature of the illness. Record from NH shows that he is DNR. Will continue that here. DVT prophylaxis : SCD     Patient is not in pain. Will monitor. Further treatments will depend on initial responses and findings.            Signed By: Pdero Ren MD     August 26, 2019

## 2019-08-26 NOTE — PROGRESS NOTES
Admission assessment complete. Pt resting in bed. Disoriented x4. Respirations present, even, unlabored. Lung sounds clear to auscultation. HR regular, S1&S2 auscultated. IVF infusing without difficulty. Bilateral SCDs in place. Laceration to left side of his face with 6 stitches in place. Skin tears to both elbows. Alleyn and brief changed. Encouraged to call for help when needed. Bed in lowest position, call light within reach, side rails x3, bed alarm in place.

## 2019-08-27 LAB
ANION GAP SERPL CALC-SCNC: 7 MMOL/L (ref 7–16)
BASOPHILS # BLD: 0 K/UL (ref 0–0.2)
BASOPHILS NFR BLD: 0 % (ref 0–2)
BUN SERPL-MCNC: 39 MG/DL (ref 8–23)
CALCIUM SERPL-MCNC: 8.6 MG/DL (ref 8.3–10.4)
CHLORIDE SERPL-SCNC: 108 MMOL/L (ref 98–107)
CO2 SERPL-SCNC: 28 MMOL/L (ref 21–32)
CREAT SERPL-MCNC: 1.26 MG/DL (ref 0.8–1.5)
DIFFERENTIAL METHOD BLD: ABNORMAL
EOSINOPHIL # BLD: 0.1 K/UL (ref 0–0.8)
EOSINOPHIL NFR BLD: 1 % (ref 0.5–7.8)
ERYTHROCYTE [DISTWIDTH] IN BLOOD BY AUTOMATED COUNT: 15.6 % (ref 11.9–14.6)
GLUCOSE SERPL-MCNC: 81 MG/DL (ref 65–100)
HCT VFR BLD AUTO: 31.3 % (ref 41.1–50.3)
HGB BLD-MCNC: 9.8 G/DL (ref 13.6–17.2)
IMM GRANULOCYTES # BLD AUTO: 0 K/UL (ref 0–0.5)
IMM GRANULOCYTES NFR BLD AUTO: 0 % (ref 0–5)
LYMPHOCYTES # BLD: 0.8 K/UL (ref 0.5–4.6)
LYMPHOCYTES NFR BLD: 9 % (ref 13–44)
MCH RBC QN AUTO: 30 PG (ref 26.1–32.9)
MCHC RBC AUTO-ENTMCNC: 31.3 G/DL (ref 31.4–35)
MCV RBC AUTO: 95.7 FL (ref 79.6–97.8)
MM INDURATION POC: 0 MM (ref 0–5)
MONOCYTES # BLD: 0.6 K/UL (ref 0.1–1.3)
MONOCYTES NFR BLD: 6 % (ref 4–12)
NEUTS SEG # BLD: 7.6 K/UL (ref 1.7–8.2)
NEUTS SEG NFR BLD: 84 % (ref 43–78)
NRBC # BLD: 0 K/UL (ref 0–0.2)
PLATELET # BLD AUTO: 223 K/UL (ref 150–450)
PMV BLD AUTO: 10.9 FL (ref 9.4–12.3)
POTASSIUM SERPL-SCNC: 3.9 MMOL/L (ref 3.5–5.1)
PPD POC: NEGATIVE NEGATIVE
RBC # BLD AUTO: 3.27 M/UL (ref 4.23–5.6)
SODIUM SERPL-SCNC: 143 MMOL/L (ref 136–145)
WBC # BLD AUTO: 9.1 K/UL (ref 4.3–11.1)

## 2019-08-27 PROCEDURE — 74011250637 HC RX REV CODE- 250/637: Performed by: INTERNAL MEDICINE

## 2019-08-27 PROCEDURE — 80048 BASIC METABOLIC PNL TOTAL CA: CPT

## 2019-08-27 PROCEDURE — 85025 COMPLETE CBC W/AUTO DIFF WBC: CPT

## 2019-08-27 PROCEDURE — 74011636637 HC RX REV CODE- 636/637: Performed by: INTERNAL MEDICINE

## 2019-08-27 PROCEDURE — 36415 COLL VENOUS BLD VENIPUNCTURE: CPT

## 2019-08-27 PROCEDURE — 97161 PT EVAL LOW COMPLEX 20 MIN: CPT

## 2019-08-27 PROCEDURE — 65270000029 HC RM PRIVATE

## 2019-08-27 PROCEDURE — 74011250637 HC RX REV CODE- 250/637: Performed by: HOSPITALIST

## 2019-08-27 RX ORDER — AMPICILLIN 500 MG/1
500 CAPSULE ORAL ONCE
Status: DISCONTINUED | OUTPATIENT
Start: 2019-08-27 | End: 2019-08-27 | Stop reason: RX

## 2019-08-27 RX ORDER — CEFPODOXIME PROXETIL 200 MG/1
200 TABLET, FILM COATED ORAL EVERY 12 HOURS
Status: DISCONTINUED | OUTPATIENT
Start: 2019-08-27 | End: 2019-08-30 | Stop reason: HOSPADM

## 2019-08-27 RX ORDER — AMOXICILLIN 500 MG/1
500 CAPSULE ORAL ONCE
Status: COMPLETED | OUTPATIENT
Start: 2019-08-27 | End: 2019-08-27

## 2019-08-27 RX ORDER — HALOPERIDOL 5 MG/ML
2 INJECTION INTRAMUSCULAR
Status: DISCONTINUED | OUTPATIENT
Start: 2019-08-27 | End: 2019-08-30 | Stop reason: HOSPADM

## 2019-08-27 RX ORDER — OLANZAPINE 5 MG/1
5 TABLET ORAL
Status: DISCONTINUED | OUTPATIENT
Start: 2019-08-27 | End: 2019-08-29

## 2019-08-27 RX ADMIN — CEFPODOXIME PROXETIL 200 MG: 200 TABLET, FILM COATED ORAL at 21:38

## 2019-08-27 RX ADMIN — OLANZAPINE 5 MG: 5 TABLET, FILM COATED ORAL at 21:38

## 2019-08-27 RX ADMIN — CEFPODOXIME PROXETIL 200 MG: 200 TABLET, FILM COATED ORAL at 14:06

## 2019-08-27 RX ADMIN — Medication 10 ML: at 06:00

## 2019-08-27 RX ADMIN — PREDNISONE 10 MG: 10 TABLET ORAL at 08:41

## 2019-08-27 RX ADMIN — PANTOPRAZOLE SODIUM 40 MG: 40 TABLET, DELAYED RELEASE ORAL at 08:41

## 2019-08-27 RX ADMIN — AMOXICILLIN 500 MG: 500 CAPSULE ORAL at 01:00

## 2019-08-27 NOTE — PROGRESS NOTES
Hospitalist Note     Admit Date:  2019  5:02 AM   Name:  Opal Trevizo   Age:  80 y.o.  :  3/30/1932   MRN:  [id number]   PCP:  Shakira Lua MD  Treatment Team: Attending Provider: Sarbjit Kaba MD; Utilization Review: Angeline Hernandez; Care Manager: Andrei Edwards RN; : LASHAY Pulido    HPI/Subjective:   Mr. Tamsen Cooks is an 81 y/o WM admitted on  with leukocytosis and frequent falls. : In chair napping, awakens to voice, attempts to communicate but doesn't make sense. WBCs and sCr now normal, afebrile since admission. Unable to obtain full ROS. Objective:     Patient Vitals for the past 24 hrs:   Temp Pulse Resp BP SpO2   19 1300 98.1 °F (36.7 °C) 67 18 124/64 97 %   19 0825 98.1 °F (36.7 °C) 66 20 115/62 96 %   19 1935 98.6 °F (37 °C) 62 18 130/54 95 %   19 1649 98.8 °F (37.1 °C) 61 16 106/60 98 %     Oxygen Therapy  O2 Sat (%): 97 % (19 1300)  Pulse via Oximetry: 69 beats per minute (19 0822)  O2 Device: Room air (19 0845)  Estimated body mass index is 20.89 kg/m² as calculated from the following:    Height as of this encounter: 6' (1.829 m). Weight as of this encounter: 69.9 kg (154 lb). Intake/Output Summary (Last 24 hours) at 2019 1436  Last data filed at 2019 1710  Gross per 24 hour   Intake    Output 200 ml   Net -200 ml       *Note that automatically entered I/Os may not be accurate; dependent on patient compliance with collection and accurate  by techs. General:    Well nourished. Alert. Pleasantly confused. CV:   RRR with ectopy. No murmur, rub, or gallop. Lungs:   CTAB. No wheezing, rhonchi, or rales. Abdomen:   Soft, nontender, nondistended. Extremities: Warm and dry. No cyanosis or edema. Skin:     No rashes or jaundice. Neuro:  No gross focal deficits. Confused.      Data Review:  I have reviewed all labs, meds, and studies from the last 24 hours:    Recent Results (from the past 24 hour(s))   METABOLIC PANEL, BASIC    Collection Time: 08/27/19  7:32 AM   Result Value Ref Range    Sodium 143 136 - 145 mmol/L    Potassium 3.9 3.5 - 5.1 mmol/L    Chloride 108 (H) 98 - 107 mmol/L    CO2 28 21 - 32 mmol/L    Anion gap 7 7 - 16 mmol/L    Glucose 81 65 - 100 mg/dL    BUN 39 (H) 8 - 23 MG/DL    Creatinine 1.26 0.8 - 1.5 MG/DL    GFR est AA >60 >60 ml/min/1.73m2    GFR est non-AA 58 (L) >60 ml/min/1.73m2    Calcium 8.6 8.3 - 10.4 MG/DL   CBC WITH AUTOMATED DIFF    Collection Time: 08/27/19  7:32 AM   Result Value Ref Range    WBC 9.1 4.3 - 11.1 K/uL    RBC 3.27 (L) 4.23 - 5.6 M/uL    HGB 9.8 (L) 13.6 - 17.2 g/dL    HCT 31.3 (L) 41.1 - 50.3 %    MCV 95.7 79.6 - 97.8 FL    MCH 30.0 26.1 - 32.9 PG    MCHC 31.3 (L) 31.4 - 35.0 g/dL    RDW 15.6 (H) 11.9 - 14.6 %    PLATELET 394 048 - 260 K/uL    MPV 10.9 9.4 - 12.3 FL    ABSOLUTE NRBC 0.00 0.0 - 0.2 K/uL    DF AUTOMATED      NEUTROPHILS 84 (H) 43 - 78 %    LYMPHOCYTES 9 (L) 13 - 44 %    MONOCYTES 6 4.0 - 12.0 %    EOSINOPHILS 1 0.5 - 7.8 %    BASOPHILS 0 0.0 - 2.0 %    IMMATURE GRANULOCYTES 0 0.0 - 5.0 %    ABS. NEUTROPHILS 7.6 1.7 - 8.2 K/UL    ABS. LYMPHOCYTES 0.8 0.5 - 4.6 K/UL    ABS. MONOCYTES 0.6 0.1 - 1.3 K/UL    ABS. EOSINOPHILS 0.1 0.0 - 0.8 K/UL    ABS. BASOPHILS 0.0 0.0 - 0.2 K/UL    ABS. IMM.  GRANS. 0.0 0.0 - 0.5 K/UL   PLEASE READ & DOCUMENT PPD TEST IN 24 HRS    Collection Time: 08/27/19 11:25 AM   Result Value Ref Range    PPD Negative Negative    mm Induration 0 0 - 5 mm        All Micro Results     Procedure Component Value Units Date/Time    CULTURE, BLOOD [[id number]] Collected:  08/26/19 0616    Order Status:  Completed Specimen:  Blood Updated:  08/27/19 0910     Special Requests: --        NO SPECIAL REQUESTS  RIGHT  Antecubital       Culture result: NO GROWTH 1 DAY       CULTURE, BLOOD [[id number]] Collected:  08/26/19 0616    Order Status:  Completed Specimen:  Blood Updated:  08/27/19 0910 Special Requests: --        RIGHT  HAND       Culture result: NO GROWTH 1 DAY       CULTURE, URINE [[id number]] Collected:  08/26/19 1145    Order Status:  Completed Specimen:  Urine from Clean catch Updated:  08/27/19 0813     Special Requests: NO SPECIAL REQUESTS        Culture result: NO GROWTH 1 DAY             No results found for this visit on 08/26/19. Current Meds:  Current Facility-Administered Medications   Medication Dose Route Frequency    OLANZapine (ZyPREXA) tablet 5 mg  5 mg Oral QHS    cefpodoxime (VANTIN) tablet 200 mg  200 mg Oral Q12H    sodium chloride (NS) flush 5-40 mL  5-40 mL IntraVENous Q8H    sodium chloride (NS) flush 5-40 mL  5-40 mL IntraVENous PRN    acetaminophen (TYLENOL) tablet 650 mg  650 mg Oral Q6H PRN    ondansetron (ZOFRAN) injection 4 mg  4 mg IntraVENous Q6H PRN    docusate sodium (COLACE) capsule 100 mg  100 mg Oral DAILY PRN    0.9% sodium chloride infusion  75 mL/hr IntraVENous CONTINUOUS    pantoprazole (PROTONIX) tablet 40 mg  40 mg Oral ACB    predniSONE (DELTASONE) tablet 10 mg  10 mg Oral DAILY WITH BREAKFAST       Other Studies (last 24 hours):  No results found. Assessment and Plan:     Hospital Problems as of 8/27/2019 Never Reviewed          Codes Class Noted - Resolved POA    Falls ICD-10-CM: W19. Rainer Lima  ICD-9-CM: E888.9  8/26/2019 - Present Unknown        * (Principal) [facility] ICD-10-CM: N17.9  ICD-9-CM: 584.9  8/26/2019 - Present Unknown        Long term systemic steroid user ICD-10-CM: Z79.52  ICD-9-CM: V58.65  8/26/2019 - Present Unknown        Leukocytosis ICD-10-CM: O96.599  ICD-9-CM: 288.60  8/26/2019 - Present Unknown              Plan:  # Debility and deconditioning with frequent falls   - PT/OT. Hopefully back to [address] Has PPD in case he needs to be placed elsewhere. # Leukocytosis   - Now normal. CXR neg, UA showed WBCs and bacteria so will change abx to Vantin since he has pulled his IV out.  Urine cx pending. # ROSA   - Resolved    # Dementia with behavioral disturbances   - Resume olanzapine. # Long term steroid use   - unclear why he's on chronic prednisone but this has been continued. DC planning/Dispo: [facility] vs SNF, possibly as early as tomorrow if SO will take him back.   Diet:  DIET MECHANICAL SOFT  DVT ppx: SCDs    Signed:  Harvey Garg MD

## 2019-08-27 NOTE — PROGRESS NOTES
Shift assessment completed via doc flow sheet. Pt confused. Lungs CTA, HR WNL, NSR. Abdomen soft and non tender. No c/o pain at this time. IVS c/d/i, no s/sx of infection/infiltration noted. No concerns at this time. Bed low and locked. Call light within reach. Bed alarm in place. Will continue to monitor.

## 2019-08-27 NOTE — PROGRESS NOTES
Pt up in recliner with posey bed alarm in place. Pt restless, keep getting up out of bed and states ,\" I'm going home. \" Dr. Ehsan Lucero made aware, pt has no PRN med for agitation/restlessness.

## 2019-08-27 NOTE — PROGRESS NOTES
Problem: Mobility Impaired (Adult and Pediatric)  Goal: *Acute Goals and Plan of Care (Insert Text)  Description    LTG:  (1.)Mr. Lewis will move from supine to sit and sit to supine  in bed with SUPERVISION within 7 treatment day(s). (2.)Mr. Lewis will transfer from bed to chair and chair to bed with STAND BY ASSIST using the least restrictive device within 7 treatment day(s). (3.)Mr. Lewis will ambulate with STAND BY ASSIST for 200 feet with the least restrictive device within 7 treatment day(s). (4)Mr. Lewis will perform HEP with cues and assistance to increase safety on his feet in 7 days. ________________________________________________________________________________________________     Outcome: Progressing Towards Goal     PHYSICAL THERAPY: Initial Assessment and AM 8/27/2019  INPATIENT: PT Visit Days : 1  [place] / [place] / Product Type: Medicare /       NAME/AGE/GENDER: Yamilka Hernandez is a 80 y.o. male   PRIMARY DIAGNOSIS: ROSA (acute kidney injury) ([facility] [address] [N17.9] ROSA (acute kidney injury) ([facility] [address]   ROSA (acute kidney injury) ([facility] [address]          ICD-10: Treatment Diagnosis:    Generalized Muscle Weakness (M62.81)  Other abnormalities of gait and mobility (R26.89)   Precaution/Allergies:  Patient has no known allergies. ASSESSMENT:     Mr. Claude Trevino presents with decreased functional mobility and gait and confusion. Alert but only oriented to self. He did know his name. He followed some simple one step commands. He refused trying a walker. He did walk briefly into the gould and then had to use the restroom. He needed constant cues and guidance to ambulate into the restroom. He returned to the chair. Will follow a few days to see if he makes progress.     This section established at most recent assessment   PROBLEM LIST (Impairments causing functional limitations):  Decreased Strength  Decreased ADL/Functional Activities  Decreased Transfer Abilities  Decreased Ambulation Ability/Technique  Decreased Balance  Increased Pain  Decreased Activity Tolerance  Decreased Flexibility/Joint Mobility  Decreased Knowledge of Precautions  Decreased [place] with Home Exercise Program  Decreased Cognition   INTERVENTIONS PLANNED: (Benefits and precautions of physical therapy have been discussed with the patient.)  Balance Exercise  Bed Mobility  Family Education  Gait Training  Home Exercise Program (HEP)  Range of Motion (ROM)  Therapeutic Activites  Therapeutic Exercise/Strengthening  Transfer Training     TREATMENT PLAN: Frequency/Duration: daily for duration of hospital stay  Rehabilitation Potential For Stated Goals: fair       REHAB RECOMMENDATIONS (at time of discharge pending progress):    Placement: To be determined  Equipment:   None at this time              HISTORY:   History of Present Injury/Illness (Reason for Referral): Tatiana Mullins is a 80 y.o. male who was brought to ER due to a fall and injury to his face today. Patient was getting up and was going to a bathroom. No report of lightheadedness. No report of slippery events. Patient fell on the floor from standing and hurt his face. No loss of consciousness. Patient could not provide much information now. No fever. No shaking. No chills. No chest pain. No shortness of breath. No fracture was found. Reportedly patient had 7 falls this month. He was found to have leukocytosis. Empiric IV antibiotics were started. Hospitalist service is requested to admit the patient. Past Medical History/Comorbidities:   Mr. Laurel Nichols  has a past medical history of Psychiatric disorder. Mr. Laurel Nichols  has no past surgical history on file.   Social History/Living Environment:   Home Environment: [address] [place] [address] Road Name: [address] [place] [address]  # Steps to Enter: 0  One/Two Story Residence: One story  Living Alone: No  Support Systems: Assisted living  Patient Expects to be Discharged to[de-identified] Unknown  Current DME Used/Available at Home: Other (comment)(at [facility])  Tub or Shower Type: Shower  Prior Level of Function/Work/Activity: At assisted living, unable to get any history from patient   Number of Personal Factors/Comorbidities that affect the Plan of Care: 3+: HIGH COMPLEXITY   EXAMINATION:   Most Recent Physical Functioning:   Gross Assessment:  AROM: Generally decreased, functional(LE's)  Strength: Generally decreased, functional(LE's)               Posture:  Posture (WDL): Exceptions to WDL  Posture Assessment: Rounded shoulders, Forward head, Other (comment)(knees flexed)  Balance:  Sitting: Intact  Standing: Impaired  Standing - Static: Fair  Standing - Dynamic : Fair Bed Mobility:  Supine to Sit: (in chair)  Wheelchair Mobility:     Transfers:  Sit to Stand: Contact guard assistance  Stand to Sit: Contact guard assistance  Gait:     Speed/Nayana: Delayed  Step Length: Left shortened;Right shortened  Gait Abnormalities: Decreased step clearance;Shuffling gait  Distance (ft): 30 Feet (ft)  Ambulation - Level of Assistance: Contact guard assistance;Minimal assistance  Interventions: Manual cues; Safety awareness training; Tactile cues; Verbal cues; Visual/Demos      Body Structures Involved:  Bones  Joints  Muscles  Ligaments Body Functions Affected: Movement Related Activities and Participation Affected: Mobility   Number of elements that affect the Plan of Care: 3: MODERATE COMPLEXITY   CLINICAL PRESENTATION:   Presentation: Stable and uncomplicated: LOW COMPLEXITY   CLINICAL DECISION MAKIN \A Chronology of [place] [facility]\"" [address] [address] AM-PAC 6 Clicks   Basic Mobility Inpatient Short Form  How much difficulty does the patient currently have. .. Unable A Lot A Little None   1. Turning over in bed (including adjusting bedclothes, sheets and blankets)? ? 1   ? 2   ? 3   ? 4   2. Sitting down on and standing up from a chair with arms ( e.g., wheelchair, bedside commode, etc.)   ? 1   ? 2   ? 3   ? 4   3.   Moving from lying on back to sitting on the side of the bed?   ? 1   ? 2   ? 3   ? 4   How much help from another person does the patient currently need. .. Total A Lot A Little None   4. Moving to and from a bed to a chair (including a wheelchair)? ? 1   ? 2   ? 3   ? 4   5. Need to walk in hospital room? ? 1   ? 2   ? 3   ? 4   6. Climbing 3-5 steps with a railing? ? 1   ? 2   ? 3   ? 4   © 2007, Trustees of [address] [place] [address] [address] [address], under license to [employer]. All rights reserved      Score:  Initial: 17 Most Recent: X (Date: -- )    Interpretation of Tool:  Represents activities that are increasingly more difficult (i.e. Bed mobility, Transfers, Gait). Medical Necessity:     Patient is expected to demonstrate progress in strength, range of motion, balance, coordination and functional technique   to decrease assistance required with exercises and functional mobility   . Reason for Services/Other Comments:  Patient continues to require present interventions due to patient's inability to perform exercises and functional mobility independently   . Use of outcome tool(s) and clinical judgement create a POC that gives a: Difficult prediction of patient's progress: HIGH COMPLEXITY            TREATMENT:   (In addition to Assessment/Re-Assessment sessions the following treatments were rendered)   Pre-treatment Symptoms/Complaints:  no complaints, confused  Pain: Initial:      Post Session:  0     Assessment/Reassessment only, no treatment provided today    Braces/Orthotics/Lines/Etc:   O2 Device: Room air  Treatment/Session Assessment:    Response to Treatment:  confusion  Interdisciplinary Collaboration:   Registered Nurse  Rehabilitation Attendant  After treatment position/precautions:   Up in chair  Bed alarm/tab alert on  Bed/Chair-wheels locked  Bed in low position  Call light within reach  RN notified   Compliance with Program/Exercises:  Will assess as treatment progresses  Recommendations/Intent for next treatment session: \"Next visit will focus on reduction in assistance provided\".   Total Treatment Duration:  PT Patient Time In/Time Out  Time In: 1135  Time Out: JORDYN Gonzalez

## 2019-08-27 NOTE — PROGRESS NOTES
Pt IV access infiltrated. Pt confused and combative at times. Unable to start another IV at this time. MD notified. Order to give Ampicillian 500mg PO X 1 at 0200. Bed alarm in place. Pt sleeping at this time. Will continue to monitor.

## 2019-08-27 NOTE — PROGRESS NOTES
EVY calls [employer] again & speaks with  Bubba Brown ) who states will need to come & assess pt & agrees to come on Wed at [address] Maria E Lockhart called [address] to speak with Smith Yanes ) but she was off today. EVY spoke with Jaleel Jones who states pt is a sleepy walker ( does not open his eyes when walking ). Per Jaleel Brown ) their  will need to come & evaluate pt before he returns. PT and OT evaluations are pending. PPD placed.

## 2019-08-27 NOTE — PROGRESS NOTES
Interdisciplinary team rounds were held 8/27/2019 with the following team members:Care Management, Nursing, Nutrition, Pharmacy, Physical Therapy and Physician. Plan of care discussed. See clinical pathway and/or care plan for interventions and desired outcomes.

## 2019-08-27 NOTE — PROGRESS NOTES
Assessment done via doc flowsheet. Pt resting quietly in bed, confused and disoriented, follows simple commands. Resp easy & regular, lungs CTA. n Laceration to forehead above left eye with sutures intact. Scattered bruises to upper extremities. Bed low & locked, side rails x3 up with posey bed alarm in place. Will monitor.

## 2019-08-27 NOTE — PROGRESS NOTES
Problem: Pressure Injury - Risk of  Goal: *Prevention of pressure injury  Description  Document Richy Scale and appropriate interventions in the flowsheet. Outcome: Progressing Towards Goal  Note:   Pressure Injury Interventions:  Sensory Interventions: Assess changes in LOC, Check visual cues for pain    Moisture Interventions: Absorbent underpads, Limit adult briefs    Activity Interventions: PT/OT evaluation    Mobility Interventions: HOB 30 degrees or less, Pressure redistribution bed/mattress (bed type), PT/OT evaluation    Nutrition Interventions: Offer support with meals,snacks and hydration                     Problem: Falls - Risk of  Goal: *Absence of Falls  Description  Document Priya Fall Risk and appropriate interventions in the flowsheet.   Outcome: Progressing Towards Goal  Note:   Fall Risk Interventions:  Mobility Interventions: Bed/chair exit alarm, Patient to call before getting OOB    Mentation Interventions: Bed/chair exit alarm, Door open when patient unattended, More frequent rounding, Room close to nurse's station         Elimination Interventions: Call light in reach, Toileting schedule/hourly rounds    History of Falls Interventions: Bed/chair exit alarm, Door open when patient unattended, Room close to nurse's station

## 2019-08-28 LAB
ANION GAP SERPL CALC-SCNC: 6 MMOL/L (ref 7–16)
BUN SERPL-MCNC: 28 MG/DL (ref 8–23)
CALCIUM SERPL-MCNC: 8.5 MG/DL (ref 8.3–10.4)
CHLORIDE SERPL-SCNC: 107 MMOL/L (ref 98–107)
CO2 SERPL-SCNC: 29 MMOL/L (ref 21–32)
CREAT SERPL-MCNC: 1.1 MG/DL (ref 0.8–1.5)
GLUCOSE SERPL-MCNC: 75 MG/DL (ref 65–100)
MM INDURATION POC: 0 MM (ref 0–5)
POTASSIUM SERPL-SCNC: 4 MMOL/L (ref 3.5–5.1)
PPD POC: NEGATIVE NEGATIVE
SODIUM SERPL-SCNC: 142 MMOL/L (ref 136–145)

## 2019-08-28 PROCEDURE — 36415 COLL VENOUS BLD VENIPUNCTURE: CPT

## 2019-08-28 PROCEDURE — 74011250637 HC RX REV CODE- 250/637: Performed by: INTERNAL MEDICINE

## 2019-08-28 PROCEDURE — 65270000029 HC RM PRIVATE

## 2019-08-28 PROCEDURE — 80048 BASIC METABOLIC PNL TOTAL CA: CPT

## 2019-08-28 PROCEDURE — 97530 THERAPEUTIC ACTIVITIES: CPT

## 2019-08-28 PROCEDURE — 74011636637 HC RX REV CODE- 636/637: Performed by: INTERNAL MEDICINE

## 2019-08-28 RX ADMIN — CEFPODOXIME PROXETIL 200 MG: 200 TABLET, FILM COATED ORAL at 08:12

## 2019-08-28 RX ADMIN — OLANZAPINE 5 MG: 5 TABLET, FILM COATED ORAL at 21:33

## 2019-08-28 RX ADMIN — PREDNISONE 10 MG: 10 TABLET ORAL at 08:12

## 2019-08-28 RX ADMIN — PANTOPRAZOLE SODIUM 40 MG: 40 TABLET, DELAYED RELEASE ORAL at 08:12

## 2019-08-28 RX ADMIN — CEFPODOXIME PROXETIL 200 MG: 200 TABLET, FILM COATED ORAL at 21:33

## 2019-08-28 NOTE — PROGRESS NOTES
Shift assessment complete. Pt resting in bed. Alert to person. Respirations present, even, unlabored. Lung sounds clear to auscultation. HR regular, S1&S2 auscultated. Laceration above left eye with sutures intact. Bed alarm in place, encouraged to call for help when needed, bed in lowest position, side rails x3.

## 2019-08-28 NOTE — PROGRESS NOTES
Problem: Pressure Injury - Risk of  Goal: *Prevention of pressure injury  Description  Document Richy Scale and appropriate interventions in the flowsheet. Outcome: Progressing Towards Goal  Note:   Pressure Injury Interventions:  Sensory Interventions: Assess changes in LOC    Moisture Interventions: Apply protective barrier, creams and emollients, Absorbent underpads    Activity Interventions: PT/OT evaluation    Mobility Interventions: Pressure redistribution bed/mattress (bed type)    Nutrition Interventions: Offer support with meals,snacks and hydration                     Problem: Falls - Risk of  Goal: *Absence of Falls  Description  Document Priya Fall Risk and appropriate interventions in the flowsheet.   Outcome: Progressing Towards Goal  Note:   Fall Risk Interventions:  Mobility Interventions: Bed/chair exit alarm    Mentation Interventions: Bed/chair exit alarm         Elimination Interventions: Call light in reach    History of Falls Interventions: Bed/chair exit alarm

## 2019-08-28 NOTE — CDMP QUERY
Pt admitted with ROSA. /leukocytosis. Pt noted to have U/A with 1+ bacteria and  WBC. If possible, please document in the progress notes and d/c summary if you are evaluating and / or treating any of the following:     Urinary Tract Infection (UTI)   Bacteriuria   Other, please specify   Clinically unable to determine    The medical record reflects the following:    Risk Factors:age    Clinical Indicators: UA with 1+ bacteria,  WBC    Treatment: Vantin 200mg q12h po. Thank You.     Domenico Gallo RN CDS  Compliant Documentation Management Program

## 2019-08-28 NOTE — PROGRESS NOTES
Care Management Interventions  PCP Verified by CM: Yes  Mode of Transport at Discharge: BLS  Transition of Care Consult (CM Consult): SNF  Partner SNF: No  Reason Why Partner SNF Not Chosen: Location  Physical Therapy Consult: Yes  Occupational Therapy Consult: Yes  Current Support Network: Assisted Living([facility])  Confirm Follow Up Transport: Family  Plan discussed with Pt/Family/Caregiver: Yes  Freedom of Choice Offered: Yes  Discharge Location  Discharge Placement: Unable to determine at this time      Sw reviewed chart. This am Rn and admin of [facility] came to eval pt. Clinical reviewed with them both. Pt had just worked with PT. Per Ernestina Chinchilla, pt is too unsteady and unable to follow her commands. She stated they do not have the staff to assist pt's one on one and do not believe he would be able to return at this time. Sw began exploring skilled options in this pt's area of [place], [place]. Pt is only here in [place] because he was at [facility] for med management and they placed him at [facility]. Sw has found two NHs in the [facility]. Left message for adm coord at [facility], and faxed clinical to [facility] for review (this facility is a sister facility to [facility] calling adm to discuss assisting in this placement). Sw plans to talk with pt's daughter about these plans. Unsure if pt has [employer], what the long term plan is, and what the money situation is.  Sw to cont to follow and assist. Read [place]

## 2019-08-28 NOTE — PROGRESS NOTES
Patient resting in bed. Respirations present, non-labored. Room air. No signs of distress noted. Safety measures in place. Will continue to monitor.

## 2019-08-28 NOTE — PROGRESS NOTES
Hospitalist Note     Admit Date:  2019  5:02 AM   Name:  John Dodson   Age:  80 y.o.  :  3/30/1932   MRN:  [id number]   PCP:  Maggy Aguliar MD  Treatment Team: Attending Provider: Lennox Rhein, MD; Utilization Review: Zeeshan Duong; Care Manager: Nayan Tobin RN; : LASHAY Escamilla    HPI/Subjective:   Mr. Anat Goins is an 79 y/o WM admitted on  with leukocytosis and frequent falls. : Asleep in chair, awakens to voice and attempts to communicate. No events overnight. Objective:     Patient Vitals for the past 24 hrs:   Temp Pulse Resp BP SpO2   19 0839 98.6 °F (37 °C) 72 16 131/76 95 %   19 0342 98.3 °F (36.8 °C) 84 18 114/68 95 %   19 1645 97.8 °F (36.6 °C) 61 18 107/66 96 %   19 1300 98.1 °F (36.7 °C) 67 18 124/64 97 %     Oxygen Therapy  O2 Sat (%): 95 % (19 0839)  Pulse via Oximetry: 69 beats per minute (19 0822)  O2 Device: Room air (19 0715)  Estimated body mass index is 20.89 kg/m² as calculated from the following:    Height as of this encounter: 6' (1.829 m). Weight as of this encounter: 69.9 kg (154 lb). No intake or output data in the 24 hours ending 19 1225    *Note that automatically entered I/Os may not be accurate; dependent on patient compliance with collection and accurate  by techs. General:    Well nourished. Sleeping, arouses to voice. CV:   RRR. No murmur, rub, or gallop. Lungs:   CTAB. No wheezing, rhonchi, or rales. Abdomen:   Soft, nontender, nondistended. Extremities: Warm and dry. No cyanosis or edema. Skin:     No rashes or jaundice. Neuro:  No gross focal deficits. Confused.     Data Review:  I have reviewed all labs, meds, and studies from the last 24 hours:    Recent Results (from the past 24 hour(s))   METABOLIC PANEL, BASIC    Collection Time: 19  6:35 AM   Result Value Ref Range    Sodium 142 136 - 145 mmol/L    Potassium 4.0 3.5 - 5.1 mmol/L    Chloride 107 98 - 107 mmol/L    CO2 29 21 - 32 mmol/L    Anion gap 6 (L) 7 - 16 mmol/L    Glucose 75 65 - 100 mg/dL    BUN 28 (H) 8 - 23 MG/DL    Creatinine 1.10 0.8 - 1.5 MG/DL    GFR est AA >60 >60 ml/min/1.73m2    GFR est non-AA >60 >60 ml/min/1.73m2    Calcium 8.5 8.3 - 10.4 MG/DL   PLEASE READ & DOCUMENT PPD TEST IN 48 HRS    Collection Time: 08/28/19 10:25 AM   Result Value Ref Range    PPD Negative Negative    mm Induration 0 0 - 5 mm        All Micro Results     Procedure Component Value Units Date/Time    CULTURE, BLOOD [[id number]] Collected:  08/26/19 0616    Order Status:  Completed Specimen:  Blood Updated:  08/28/19 0837     Special Requests: --        NO SPECIAL REQUESTS  RIGHT  Antecubital       Culture result: NO GROWTH 2 DAYS       CULTURE, BLOOD [[id number]] Collected:  08/26/19 0616    Order Status:  Completed Specimen:  Blood Updated:  08/28/19 0837     Special Requests: --        RIGHT  HAND       Culture result: NO GROWTH 2 DAYS       CULTURE, URINE [[id number]] Collected:  08/26/19 1145    Order Status:  Completed Specimen:  Urine from Clean catch Updated:  08/27/19 0813     Special Requests: NO SPECIAL REQUESTS        Culture result: NO GROWTH 1 DAY             No results found for this visit on 08/26/19.     Current Meds:  Current Facility-Administered Medications   Medication Dose Route Frequency    OLANZapine (ZyPREXA) tablet 5 mg  5 mg Oral QHS    cefpodoxime (VANTIN) tablet 200 mg  200 mg Oral Q12H    haloperidol lactate (HALDOL) injection 2 mg  2 mg IntraMUSCular Q4H PRN    sodium chloride (NS) flush 5-40 mL  5-40 mL IntraVENous Q8H    sodium chloride (NS) flush 5-40 mL  5-40 mL IntraVENous PRN    acetaminophen (TYLENOL) tablet 650 mg  650 mg Oral Q6H PRN    ondansetron (ZOFRAN) injection 4 mg  4 mg IntraVENous Q6H PRN    docusate sodium (COLACE) capsule 100 mg  100 mg Oral DAILY PRN    0.9% sodium chloride infusion  75 mL/hr IntraVENous CONTINUOUS    pantoprazole (PROTONIX) tablet 40 mg  40 mg Oral ACB    predniSONE (DELTASONE) tablet 10 mg  10 mg Oral DAILY WITH BREAKFAST       Other Studies (last 24 hours):  No results found. Assessment and Plan:     Hospital Problems as of 8/28/2019 Never Reviewed          Codes Class Noted - Resolved POA    Falls ICD-10-CM: W19. Jane Momin  ICD-9-CM: E888.9  8/26/2019 - Present Yes        * (Principal) [facility]acute [facility]) ([facility] ICD-10-CM: N17.9  ICD-9-CM: 584.9  8/26/2019 - Present Yes        Long term systemic steroid user ICD-10-CM: Z79.52  ICD-9-CM: V58.65  8/26/2019 - Present Yes        Leukocytosis ICD-10-CM: W17.605  ICD-9-CM: 288.60  8/26/2019 - Present Yes              Plan:  # Debility and deconditioning with frequent falls              - PT/OT. Declined by [facility] for return. CM following.     # Leukocytosis              - Resolved. CXR neg, cultures neg to date, perhaps urinary source based on UA? Abx changed to oral on 8/27. # UTI/bacteriuria   - Unsure if symptomatic or not based on mental status so will treat as though it is. Started on vantin 8/27     # ROSA              - Resolved     # Dementia with behavioral disturbances              - Olanzapine     # Long term steroid use              - unclear why he's on chronic prednisone but this has been continued. DC planning/Dispo: Declined by [facility] for return. Difficult dispo.   Diet:  DIET MECHANICAL SOFT  DVT ppx: SCDs     Signed:  Jennifer Townsend MD

## 2019-08-28 NOTE — PROGRESS NOTES
Problem: Mobility Impaired (Adult and Pediatric)  Goal: *Acute Goals and Plan of Care (Insert Text)  Description    LTG:  (1.)Mr. Lewis will move from supine to sit and sit to supine  in bed with SUPERVISION within 7 treatment day(s). (2.)Mr. Lewis will transfer from bed to chair and chair to bed with STAND BY ASSIST using the least restrictive device within 7 treatment day(s). (3.)Mr. Lewis will ambulate with STAND BY ASSIST for 200 feet with the least restrictive device within 7 treatment day(s). (4)Mr. Lewis will perform HEP with cues and assistance to increase safety on his feet in 7 days. ________________________________________________________________________________________________     Outcome: Progressing Towards Goal     PHYSICAL THERAPY: Daily Note and AM 8/28/2019  INPATIENT: PT Visit Days : 2  [place] / [place] / Product Type: Medicare /       NAME/AGE/GENDER: John Dodson is a 80 y.o. male   PRIMARY DIAGNOSIS: ROSA (acute kidney injury) ([facility] [N17.9] ROSA (acute kidney injury) ([facility]   ROSA (acute kidney injury) ([facility]         ICD-10: Treatment Diagnosis:    · Generalized Muscle Weakness (M62.81)  · Other abnormalities of gait and mobility (R26.89)   Precaution/Allergies:  Patient has no known allergies. ASSESSMENT:     Mr. Anat Goins presents with decreased functional mobility and gait and confusion. Pt. Alert this am in the bed. He is making verbalizations, able to understand a few things he says. Extra time needed for all mobility. He followed most one step commands, pleasant throughout session. He needed a gown change as he spilled food on it. He ambulated slowly with hand held assist down the gould. Needed assist for balance, no loss of balance. Left back in chair with chair alarm on. He complained some of right sided rib pain.       This section established at most recent assessment   PROBLEM LIST (Impairments causing functional limitations):  1. Decreased Strength  2. Decreased ADL/Functional Activities  3. Decreased Transfer Abilities  4. Decreased Ambulation Ability/Technique  5. Decreased Balance  6. Increased Pain  7. Decreased Activity Tolerance  8. Decreased Flexibility/Joint Mobility  9. Decreased Knowledge of Precautions  10. Decreased [place] with Home Exercise Program  11. Decreased Cognition   INTERVENTIONS PLANNED: (Benefits and precautions of physical therapy have been discussed with the patient.)  1. Balance Exercise  2. Bed Mobility  3. Family Education  4. Gait Training  5. Home Exercise Program (HEP)  6. Range of Motion (ROM)  7. Therapeutic Activites  8. Therapeutic Exercise/Strengthening  9. Transfer Training     TREATMENT PLAN: Frequency/Duration: daily for duration of hospital stay  Rehabilitation Potential For Stated Goals: fair       REHAB RECOMMENDATIONS (at time of discharge pending progress):    Placement: To be determined  Equipment:    None at this time              HISTORY:   History of Present Injury/Illness (Reason for Referral): Destiny Bailey is a 80 y.o. male who was brought to ER due to a fall and injury to his face today. Patient was getting up and was going to a bathroom. No report of lightheadedness. No report of slippery events. Patient fell on the floor from standing and hurt his face. No loss of consciousness. Patient could not provide much information now. No fever. No shaking. No chills. No chest pain. No shortness of breath. No fracture was found. Reportedly patient had 7 falls this month. He was found to have leukocytosis. Empiric IV antibiotics were started. Hospitalist service is requested to admit the patient. Past Medical History/Comorbidities:   Mr. Tessa Sheets  has a past medical history of Psychiatric disorder. Mr. Tessa Sheets  has no past surgical history on file.   Social History/Living Environment:   Home Environment: [address] [place] [address] Road Name: [address] [place] [address] Nichole  # Steps to Enter: 0  One/Two Story Residence: One story  Living Alone: No  Support Systems: Assisted living  Patient Expects to be Discharged to[de-identified] Unknown  Current DME Used/Available at Home: Other (comment)(at [facility])  Tub or Shower Type: Shower  Prior Level of Function/Work/Activity: At assisted living, unable to get any history from patient   Number of Personal Factors/Comorbidities that affect the Plan of Care: 3+: HIGH COMPLEXITY   EXAMINATION:   Most Recent Physical Functioning:   Gross Assessment:  AROM: Generally decreased, functional(LE's)  Strength: Generally decreased, functional(LE's)               Posture:  Posture (WDL): Exceptions to WDL  Posture Assessment: Rounded shoulders, Forward head, Other (comment)(knees flexed)  Balance:  Sitting: Intact  Standing: Impaired  Standing - Static: Fair  Standing - Dynamic : Fair Bed Mobility:  Supine to Sit: Minimum assistance  Wheelchair Mobility:     Transfers:  Sit to Stand: Contact guard assistance  Stand to Sit: Contact guard assistance  Bed to Chair: Contact guard assistance  Duration: 25 Minutes  Gait:     Speed/Nayana: Delayed  Step Length: Left shortened;Right shortened  Gait Abnormalities: Decreased step clearance; Other(knee flexed)  Distance (ft): 200 Feet (ft)  Assistive Device: (hand held assist)  Ambulation - Level of Assistance: Contact guard assistance;Minimal assistance  Interventions: Manual cues; Safety awareness training; Tactile cues; Verbal cues      Body Structures Involved:  1. Bones  2. Joints  3. Muscles  4. Ligaments Body Functions Affected:  1. Movement Related Activities and Participation Affected:  1. Mobility   Number of elements that affect the Plan of Care: 3: MODERATE COMPLEXITY   CLINICAL PRESENTATION:   Presentation: Stable and uncomplicated: LOW COMPLEXITY   CLINICAL DECISION MAKIN [facility] [address] [id number] AM-PAC 6 Clicks   Basic Mobility Inpatient Short Form  How much difficulty does the patient currently have. ..  Unable A Lot A Little None   1. Turning over in bed (including adjusting bedclothes, sheets and blankets)? ? 1   ? 2   ? 3   ? 4   2. Sitting down on and standing up from a chair with arms ( e.g., wheelchair, bedside commode, etc.)   ? 1   ? 2   ? 3   ? 4   3. Moving from lying on back to sitting on the side of the bed?   ? 1   ? 2   ? 3   ? 4   How much help from another person does the patient currently need. .. Total A Lot A Little None   4. Moving to and from a bed to a chair (including a wheelchair)? ? 1   ? 2   ? 3   ? 4   5. Need to walk in hospital room? ? 1   ? 2   ? 3   ? 4   6. Climbing 3-5 steps with a railing? ? 1   ? 2   ? 3   ? 4   © 2007, Trustees of [facility] [employer], under license to [employer]. All rights reserved      Score:  Initial: 17 Most Recent: X (Date: -- )    Interpretation of Tool:  Represents activities that are increasingly more difficult (i.e. Bed mobility, Transfers, Gait). Medical Necessity:     · Patient is expected to demonstrate progress in strength, range of motion, balance, coordination and functional technique  ·  to decrease assistance required with exercises and functional mobility   · . Reason for Services/Other Comments:  · Patient continues to require present interventions due to patient's inability to perform exercises and functional mobility independently   · . Use of outcome tool(s) and clinical judgement create a POC that gives a: Difficult prediction of patient's progress: HIGH COMPLEXITY            TREATMENT:   (In addition to Assessment/Re-Assessment sessions the following treatments were rendered)   Pre-treatment Symptoms/Complaints:  Right sided rib pain  Pain: Initial:      Post Session:  0     Therapeutic Activity: (  25 Minutes ):  Therapeutic activities including Bed transfers, Chair transfers, Ambulation on level ground and standing to improve mobility, strength and balance. Required minimal Manual cues; Safety awareness training; Tactile cues;Verbal cues to promote static and dynamic balance in standing. Braces/Orthotics/Lines/Etc:   · O2 Device: Room air  Treatment/Session Assessment:    · Response to Treatment:  Did fine, confusion  · Interdisciplinary Collaboration:   o Registered Nurse  o Rehabilitation Attendant  · After treatment position/precautions:   o Up in chair  o Bed alarm/tab alert on  o Bed/Chair-wheels locked  o Bed in low position  o Call light within reach  o RN notified   · Compliance with Program/Exercises: Will assess as treatment progresses  · Recommendations/Intent for next treatment session: \"Next visit will focus on reduction in assistance provided\".   Total Treatment Duration:  PT Patient Time In/Time Out  Time In: 0845  Time Out: Wendy [place] 58, PT

## 2019-08-28 NOTE — PROGRESS NOTES
Interdisciplinary team rounds were held 8/28/2019 with the following team members:Care Management, Nursing, Nutrition, Pharmacy, Physical Therapy and Physician. Plan of care discussed. See clinical pathway and/or care plan for interventions and desired outcomes.

## 2019-08-29 LAB
BACTERIA SPEC CULT: NORMAL
SERVICE CMNT-IMP: NORMAL

## 2019-08-29 PROCEDURE — 74011250637 HC RX REV CODE- 250/637: Performed by: INTERNAL MEDICINE

## 2019-08-29 PROCEDURE — 65270000029 HC RM PRIVATE

## 2019-08-29 PROCEDURE — 74011636637 HC RX REV CODE- 636/637: Performed by: INTERNAL MEDICINE

## 2019-08-29 PROCEDURE — 97530 THERAPEUTIC ACTIVITIES: CPT

## 2019-08-29 RX ORDER — OLANZAPINE 2.5 MG/1
2.5 TABLET ORAL
Status: DISCONTINUED | OUTPATIENT
Start: 2019-08-29 | End: 2019-08-30 | Stop reason: HOSPADM

## 2019-08-29 RX ADMIN — CEFPODOXIME PROXETIL 200 MG: 200 TABLET, FILM COATED ORAL at 09:14

## 2019-08-29 RX ADMIN — Medication 10 ML: at 22:09

## 2019-08-29 RX ADMIN — OLANZAPINE 2.5 MG: 2.5 TABLET, FILM COATED ORAL at 21:58

## 2019-08-29 RX ADMIN — PANTOPRAZOLE SODIUM 40 MG: 40 TABLET, DELAYED RELEASE ORAL at 09:14

## 2019-08-29 RX ADMIN — PREDNISONE 10 MG: 10 TABLET ORAL at 09:14

## 2019-08-29 RX ADMIN — CEFPODOXIME PROXETIL 200 MG: 200 TABLET, FILM COATED ORAL at 21:58

## 2019-08-29 NOTE — PROGRESS NOTES
Hospitalist Note     Admit Date:  2019  5:02 AM   Name:  Betzaida Jones   Age:  80 y.o.  :  3/30/1932   MRN:  [id number]   PCP:  Ellis Miller MD  Treatment Team: Attending Provider: Shila Cruz MD; Utilization Review: Merlinda Nettles; Care Manager: Tom Padron RN; : Wilhelm Lombard; Care Manager: Danuta Coronel LMSW    HPI/Subjective:   Mr. Xi Tirado is an 79 y/o WM admitted on  with leukocytosis and frequent falls. : Sleeping, awakens to voice, nonsensical speech. Denies pain. Moves all 4 extremities spontaneously. Objective:     Patient Vitals for the past 24 hrs:   Temp Pulse Resp BP SpO2   19 0737 98.2 °F (36.8 °C) 62 16 151/73 100 %   19 0427 97.7 °F (36.5 °C) 62 18 97/55 91 %   19 1942 98 °F (36.7 °C) 69 18 116/66 97 %   19 1701 98.1 °F (36.7 °C) 93 18 157/74 96 %   19 1454 98.1 °F (36.7 °C) 65 16 124/83 90 %     Oxygen Therapy  O2 Sat (%): 100 % (19 0737)  Pulse via Oximetry: 69 beats per minute (19 0822)  O2 Device: Room air (19 0715)  Estimated body mass index is 20.89 kg/m² as calculated from the following:    Height as of this encounter: 6' (1.829 m). Weight as of this encounter: 69.9 kg (154 lb). No intake or output data in the 24 hours ending 19 1120    *Note that automatically entered I/Os may not be accurate; dependent on patient compliance with collection and accurate  by techs. General:    Well nourished. Alert. Chronically ill appearing. CV:   RRR. No murmur, rub, or gallop. Lungs:   CTAB. No wheezing, rhonchi, or rales. Abdomen:   Soft, nontender, nondistended. Extremities: Warm and dry. No cyanosis or edema. Skin:     No rashes or jaundice. Scattered lacerations over arms and face with some ecchymoses. Laceration with stitches to forehead. Neuro:  No gross focal deficits, moves all extremities.     Data Review:  I have reviewed all labs, meds, and studies from the last 24 hours:    No results found for this or any previous visit (from the past 24 hour(s)). All Micro Results     Procedure Component Value Units Date/Time    CULTURE, BLOOD [[id number]] Collected:  08/26/19 0616    Order Status:  Completed Specimen:  Blood Updated:  08/29/19 1119     Special Requests: --        RIGHT  HAND       Culture result: NO GROWTH 3 DAYS       CULTURE, BLOOD [[id number]] Collected:  08/26/19 0616    Order Status:  Completed Specimen:  Blood Updated:  08/29/19 1119     Special Requests: --        NO SPECIAL REQUESTS  RIGHT  Antecubital       Culture result: NO GROWTH 3 DAYS       CULTURE, URINE [[id number]] Collected:  08/26/19 1145    Order Status:  Completed Specimen:  Urine from Clean catch Updated:  08/29/19 0644     Special Requests: NO SPECIAL REQUESTS        Culture result: NO GROWTH 2 DAYS             No results found for this visit on 08/26/19. Current Meds:  Current Facility-Administered Medications   Medication Dose Route Frequency    OLANZapine (ZyPREXA) tablet 5 mg  5 mg Oral QHS    cefpodoxime (VANTIN) tablet 200 mg  200 mg Oral Q12H    haloperidol lactate (HALDOL) injection 2 mg  2 mg IntraMUSCular Q4H PRN    sodium chloride (NS) flush 5-40 mL  5-40 mL IntraVENous Q8H    sodium chloride (NS) flush 5-40 mL  5-40 mL IntraVENous PRN    acetaminophen (TYLENOL) tablet 650 mg  650 mg Oral Q6H PRN    ondansetron (ZOFRAN) injection 4 mg  4 mg IntraVENous Q6H PRN    docusate sodium (COLACE) capsule 100 mg  100 mg Oral DAILY PRN    pantoprazole (PROTONIX) tablet 40 mg  40 mg Oral ACB    predniSONE (DELTASONE) tablet 10 mg  10 mg Oral DAILY WITH BREAKFAST       Other Studies (last 24 hours):  No results found. Assessment and Plan:     Hospital Problems as of 8/29/2019 Never Reviewed          Codes Class Noted - Resolved POA    Falls ICD-10-CM: W19Sal [place]  ICD-9-CM: V328.2  8/26/2019 - Present Yes        * (Principal) ROSA (acute kidney injury) [facility]) ICD-10-CM: N17.9  ICD-9-CM: 584.9  8/26/2019 - Present Yes        Long term systemic steroid user ICD-10-CM: Z79.52  ICD-9-CM: V58.65  8/26/2019 - Present Yes        Leukocytosis ICD-10-CM: X93.177  ICD-9-CM: 288.60  8/26/2019 - Present Yes              Plan:  # Debility and deconditioning with frequent falls              - PT/OT. Declined by [facility] for return. CM following. Difficult dispo. # Dementia with behavioral disturbances              - Seems to be sleeping more during the day with resumption of Zyprexa, will cute dose in half and see if this helps. # Leukocytosis              - Resolved. CXR neg, cultures neg to date, perhaps urinary source based on UA so IV changed to [facility] 8/27.     # UTI/bacteriuria              - Unsure if symptomatic or not based on mental status so will treat as though it is. As above.     # ROSA              - Resolved     Olanzapine     # Long term steroid use              - unclear why he's on chronic prednisone but this has been continued.       DC planning/Dispo: Difficult. Declined for return by [facility].   Diet:  DIET MECHANICAL SOFT  DVT ppx: SCDs    Signed:  King Cerrato MD

## 2019-08-29 NOTE — PROGRESS NOTES
Interdisciplinary team rounds were held 8/29/2019 with the following team members:Care Management, Nursing, Nutrition, Pharmacy, Physical Therapy and Physician . Plan of care discussed. See clinical pathway and/or care plan for interventions and desired outcomes.

## 2019-08-29 NOTE — PROGRESS NOTES
Problem: Mobility Impaired (Adult and Pediatric)  Goal: *Acute Goals and Plan of Care (Insert Text)  Description    LTG:  (1.)Mr. Lewis will move from supine to sit and sit to supine  in bed with SUPERVISION within 7 treatment day(s). (2.)Mr. Lewis will transfer from bed to chair and chair to bed with STAND BY ASSIST using the least restrictive device within 7 treatment day(s). (3.)Mr. Lewis will ambulate with STAND BY ASSIST for 200 feet with the least restrictive device within 7 treatment day(s). (4)Mr. Lewis will perform HEP with cues and assistance to increase safety on his feet in 7 days. ________________________________________________________________________________________________     Outcome: Progressing Towards Goal     PHYSICAL THERAPY: Daily Note and AM 8/29/2019  INPATIENT: PT Visit Days : 3  [place] / [place] PART [place] B / Product Type: Medicare /       NAME/AGE/GENDER: Angelique Cheatham is a 80 y.o. male   PRIMARY DIAGNOSIS: ROSA (acute kidney injury) ([facility] [N17.9] ROSA (acute kidney injury) ([facility]   ROSA (acute kidney injury) ([facility]         ICD-10: Treatment Diagnosis:    · Generalized Muscle Weakness (M62.81)  · Other abnormalities of gait and mobility (R26.89)   Precaution/Allergies:  Patient has no known allergies. ASSESSMENT:     Mr. Adam Salinas presents with decreased functional mobility and gait and confusion. Pt. Agreeable to get up this am and wanting to eat breakfast.  \"I'm hungry\". Pt. Very pleasant and following one step commands. All movements are slow and takes time for him to move. He ambulated in the gould with hand held assist at first and then [facility]. He needs constant guidance and direction. He was left in chair with alarm in chair and call bell close and set up for breakfast.    This section established at most recent assessment   PROBLEM LIST (Impairments causing functional limitations):  1. Decreased Strength  2.  Decreased ADL/Functional Activities  3. Decreased Transfer Abilities  4. Decreased Ambulation Ability/Technique  5. Decreased Balance  6. Increased Pain  7. Decreased Activity Tolerance  8. Decreased Flexibility/Joint Mobility  9. Decreased Knowledge of Precautions  10. Decreased [place] with Home Exercise Program  11. Decreased Cognition   INTERVENTIONS PLANNED: (Benefits and precautions of physical therapy have been discussed with the patient.)  1. Balance Exercise  2. Bed Mobility  3. Family Education  4. Gait Training  5. Home Exercise Program (HEP)  6. Range of Motion (ROM)  7. Therapeutic Activites  8. Therapeutic Exercise/Strengthening  9. Transfer Training     TREATMENT PLAN: Frequency/Duration: daily for duration of hospital stay  Rehabilitation Potential For Stated Goals: fair       REHAB RECOMMENDATIONS (at time of discharge pending progress):    Placement: To be determined  Equipment:    None at this time              HISTORY:   History of Present Injury/Illness (Reason for Referral): Lizeth Cruz is a 80 y.o. male who was brought to ER due to a fall and injury to his face today. Patient was getting up and was going to a bathroom. No report of lightheadedness. No report of slippery events. Patient fell on the floor from standing and hurt his face. No loss of consciousness. Patient could not provide much information now. No fever. No shaking. No chills. No chest pain. No shortness of breath. No fracture was found. Reportedly patient had 7 falls this month. He was found to have leukocytosis. Empiric IV antibiotics were started. Hospitalist service is requested to admit the patient. Past Medical History/Comorbidities:   Mr. Gonzalo Sierra  has a past medical history of Psychiatric disorder. Mr. Gonzalo Sierra  has no past surgical history on file.   Social History/Living Environment:   [facility] [facility] Name: [address] [place] [address]  # Steps to Enter: 0  One/Two Story Residence: [facility] story  Living Alone: No  Support Systems: Assisted living  Patient Expects to be Discharged to[de-identified] Unknown  Current DME Used/Available at Home: Other (comment)(at [facility])  Tub or Shower Type: Shower  Prior Level of Function/Work/Activity: At assisted living, unable to get any history from patient   Number of Personal Factors/Comorbidities that affect the Plan of Care: 3+: HIGH COMPLEXITY   EXAMINATION:   Most Recent Physical Functioning:   Gross Assessment:  AROM: Generally decreased, functional(LE's)  Strength: Generally decreased, functional(LE's)               Posture:  Posture (WDL): Exceptions to WDL  Posture Assessment: Rounded shoulders, Forward head, Other (comment)(knees flexed)  Balance:  Sitting: Intact  Standing: Impaired  Standing - Static: Fair  Standing - Dynamic : Fair Bed Mobility:  Supine to Sit: Contact guard assistance  Wheelchair Mobility:     Transfers:  Sit to Stand: Contact guard assistance  Stand to Sit: Contact guard assistance  Bed to Chair: Contact guard assistance  Duration: 25 Minutes  Gait:     Speed/Nayana: Delayed  Step Length: Left shortened;Right shortened  Gait Abnormalities: Decreased step clearance  Distance (ft): 125 Feet (ft)  Assistive Device: (hand held assist at first, then CGA)  Ambulation - Level of Assistance: Contact guard assistance;Minimal assistance  Interventions: Safety awareness training;Verbal cues;Manual cues; Tactile cues      Body Structures Involved:  1. Bones  2. Joints  3. Muscles  4. Ligaments Body Functions Affected:  1. Movement Related Activities and Participation Affected:  1. Mobility   Number of elements that affect the Plan of Care: 3: MODERATE COMPLEXITY   CLINICAL PRESENTATION:   Presentation: Stable and uncomplicated: LOW COMPLEXITY   CLINICAL DECISION MAKIN [facility] [address] [address] AM-PAC 6 Clicks   Basic Mobility Inpatient Short Form  How much difficulty does the patient currently have. .. Unable A Lot A Little None   1.   Turning over in bed (including adjusting bedclothes, sheets and blankets)? ? 1   ? 2   ? 3   ? 4   2. Sitting down on and standing up from a chair with arms ( e.g., wheelchair, bedside commode, etc.)   ? 1   ? 2   ? 3   ? 4   3. Moving from lying on back to sitting on the side of the bed?   ? 1   ? 2   ? 3   ? 4   How much help from another person does the patient currently need. .. Total A Lot A Little None   4. Moving to and from a bed to a chair (including a wheelchair)? ? 1   ? 2   ? 3   ? 4   5. Need to walk in hospital room? ? 1   ? 2   ? 3   ? 4   6. Climbing 3-5 steps with a railing? ? 1   ? 2   ? 3   ? 4   © 2007, Trustees of [address] [place] [address] [address] [address], under license to [employer]. All rights reserved      Score:  Initial: 17 Most Recent: X (Date: -- )    Interpretation of Tool:  Represents activities that are increasingly more difficult (i.e. Bed mobility, Transfers, Gait). Medical Necessity:     · Patient is expected to demonstrate progress in strength, range of motion, balance, coordination and functional technique  ·  to decrease assistance required with exercises and functional mobility   · . Reason for Services/Other Comments:  · Patient continues to require present interventions due to patient's inability to perform exercises and functional mobility independently   · . Use of outcome tool(s) and clinical judgement create a POC that gives a: Difficult prediction of patient's progress: HIGH COMPLEXITY            TREATMENT:   (In addition to Assessment/Re-Assessment sessions the following treatments were rendered)   Pre-treatment Symptoms/Complaints:  No complaints at all  Pain: Initial:      Post Session:  0     Therapeutic Activity: (  25 Minutes ):  Therapeutic activities including Bed transfers, Chair transfers, Ambulation on level ground and standing to improve mobility, strength and balance. Required minimal Safety awareness training;Verbal cues;Manual cues; Tactile cues to promote static and dynamic balance in standing. Braces/Orthotics/Lines/Etc:   · O2 Device: Room air  Treatment/Session Assessment:    · Response to Treatment:  Doing well, confused but pleasant. · Interdisciplinary Collaboration:   o Registered Nurse  o Rehabilitation Attendant  · After treatment position/precautions:   o Up in chair  o Bed alarm/tab alert on  o Bed/Chair-wheels locked  o Bed in low position  o Call light within reach  o RN notified   · Compliance with Program/Exercises: Will assess as treatment progresses  · Recommendations/Intent for next treatment session: \"Next visit will focus on reduction in assistance provided\".   Total Treatment Duration:  PT Patient Time In/Time Out  Time In: 3005  Time Out: 0900    Seth Sandhoff, PT

## 2019-08-29 NOTE — PROGRESS NOTES
Pt resting in bed and is alert and disoriented x 4. he denies pain and is on room air. RR even and unlabored. Call light in reach and pt instructed to call for assistance if needed. Will monitor. Bed alarm on.

## 2019-08-29 NOTE — PROGRESS NOTES
Patient resting in recliner. Respirations present, non-labored. HR irregular. Room air. No signs of distress noted. Will continue to monitor.

## 2019-08-29 NOTE — PROGRESS NOTES
Lengthy phone conversation with pt's daughter Lucila Robles ([phone]) this pm. Daughter reviewed events leading up to pt's hospitalization. Pt was living with his daughter and son in law until she could no longer care for him due to her back issues. Pt was placed at [facility] where he lasted for 4 months. At some point pt's dementia worsened and Ativan being overused per the daughter and pt began falling. Pt had 3 ER visits and an overnight ER stay. Pt was sent to [facility] for medication adjustments. Pt was later sent to [facility] where he also fell several times in 5 days. Pt was finally adm to hospital on 8/26 due to ROSA. Per Lucila Robles she has been working on [employer]. Five years or so ago pt put land into his [place] trust. Per Medicaid (who now looks back 7 yrs) this land must be assessed and sold and the money used towards his care and then he would qualify for Medicaid. Daughter has already been to a  and is having the land appraised. Her son plans to buy the land. Pt is a [employer]  and makes $2,679.00 a month. Pt has [facility] and [facility]. Ame spent much time working on a much more suitable discharge plan. Ame talked with Nora Lin the adm coord at [facility] who informed they would accept him once they have a bed. She MIGHT have a bed tomorrow but if not it would likely be the following week or so before one would be avail. Ame talked to [place] who is also calling her other facilities near [place] and [facility] to see if a bed closer to home is avail. At last resort, pt could go to [address] [place] [address] (once a bed became avail) to rehab until bed opens at [facility]. Pt would have to pay $170.50 per day as the co pay after the 20 Medicare days. Unsure if his VA coverage helps with Rehab costs. Pt needs to be placed in or near his home in [facility] and placed long term in skilled due to his needs.  Ame will cont to follow and assist. Kirby Mayo called Rhoda at [address] to inform of situation. Ame faxed clinical info to Rhoda late Thurs pm. Await return call about temporary bed offer. Ame needs to call Shaun Johansen in am to see if pt can transfer to their facility for the time being [facility]. Ame also needs to call Jamila Estrella at [facility] to update her on which facility pt goes to so the two NHs can be in touch with each other. Daughter needs to be updated on whole situation once bed found.  Lynna Sandifer

## 2019-08-30 VITALS
WEIGHT: 154 LBS | RESPIRATION RATE: 16 BRPM | TEMPERATURE: 97.8 F | SYSTOLIC BLOOD PRESSURE: 118 MMHG | BODY MASS INDEX: 20.86 KG/M2 | DIASTOLIC BLOOD PRESSURE: 61 MMHG | HEART RATE: 68 BPM | HEIGHT: 72 IN | OXYGEN SATURATION: 94 %

## 2019-08-30 PROBLEM — D72.829 LEUKOCYTOSIS: Status: RESOLVED | Noted: 2019-08-26 | Resolved: 2019-08-30

## 2019-08-30 PROBLEM — N17.9 AKI (ACUTE KIDNEY INJURY) (HCC): Status: RESOLVED | Noted: 2019-08-26 | Resolved: 2019-08-30

## 2019-08-30 PROCEDURE — 74011636637 HC RX REV CODE- 636/637: Performed by: INTERNAL MEDICINE

## 2019-08-30 PROCEDURE — 97530 THERAPEUTIC ACTIVITIES: CPT

## 2019-08-30 PROCEDURE — 74011250637 HC RX REV CODE- 250/637: Performed by: INTERNAL MEDICINE

## 2019-08-30 RX ORDER — OLANZAPINE 2.5 MG/1
2.5 TABLET ORAL
Qty: 10 TAB | Refills: 0 | Status: SHIPPED | OUTPATIENT
Start: 2019-08-30

## 2019-08-30 RX ORDER — CEFPODOXIME PROXETIL 200 MG/1
200 TABLET, FILM COATED ORAL EVERY 12 HOURS
Qty: 4 TAB | Refills: 0 | Status: SHIPPED | OUTPATIENT
Start: 2019-08-30 | End: 2019-09-01

## 2019-08-30 RX ADMIN — PREDNISONE 10 MG: 10 TABLET ORAL at 10:13

## 2019-08-30 RX ADMIN — CEFPODOXIME PROXETIL 200 MG: 200 TABLET, FILM COATED ORAL at 10:13

## 2019-08-30 RX ADMIN — Medication 10 ML: at 06:11

## 2019-08-30 NOTE — PROGRESS NOTES
Patient discussed during morning huddle and is anticipated to discharge today. Plan for discharge is as follows:    Care Management Interventions  PCP Verified by CM: Yes  Mode of Transport at Discharge: BLS  Transition of Care Consult (CM Consult): SNF  Partner SNF: No  Reason Why Partner SNF Not Chosen: Location  Physical Therapy Consult: Yes  Occupational Therapy Consult: Yes  Current Support Network: Assisted Living  Confirm Follow Up Transport: Family  Plan discussed with Pt/Family/Caregiver: Yes  Freedom of Choice Offered: Yes  Discharge Location  Discharge Placement: Skilled nursing facility    Milestones and interventions have been entered.      Lorenda Cola, [place]    [address] [facility]  [email or web address]

## 2019-08-30 NOTE — PROGRESS NOTES
Interdisciplinary team rounds were held 8/30/2019 with the following team members:Care Management, Nursing, Nutrition, Pharmacy, Physical Therapy and Physician. Plan of care discussed. See clinical pathway and/or care plan for interventions and desired outcomes.

## 2019-08-30 NOTE — PROGRESS NOTES
Shift assessment completed via doc flow sheet. Pt is drowsy but easily aroused. Sitting in recliner chair at the nursing desk now. No acute distress noted. Pt is then transferred to bed. No s/sx of pain observed. Bed alarm on. Bed in low and locked position. Call light within reach.

## 2019-08-30 NOTE — PROGRESS NOTES
Patient is a DNR and the patient is unable to sign his transport DNR. The patient's daughter lives three hours away and cannot sign either. SW offered to send it via email but the patient's daughter does not have Internet access, and no family lives locally. CM supervisor Carmen Larson spoke with  Rahul Thompson who states that SW cannot sign for the patient's daughter despite having verbal permission from her. The patient has a hx of dementia and cannot sign either. SW advised the patient's daughter that he will need to be sent as a full code unless she or another family member can sign the transport DNR. Discharge summaries faxed to [address] [place] [address] with [facility]. Sw called [address] [place] [address] who is arranging with the patient's daughter to come and sign paperwork, states that the patient is okay to transfer today.        Nessa Parsons, [facility]    [address] [facility]  [email or web address]

## 2019-08-30 NOTE — PROGRESS NOTES
EVY called Rhoda with [facility] in [place] ([phone]) who states that the patient has a bed and it is available today. EVY also called Redge Meth with [facility] ([phone]) and [facility] advising that the patient has a bed at [facility] and will transfer today. Finally, EVY called Lucila Robles ([phone]) the patient's daughter who states that she lives three hours away and the only way the patient can transfer is via EMS. EVY will prepare the patient's packet and arranged for EMS transport.      Thompson Vazquez    [address] [facility]  [email or web address]

## 2019-08-30 NOTE — PROGRESS NOTES
Hospitalist Note     Admit Date:  2019  5:02 AM   Name:  Rebecca Luna   Age:  80 y.o.  :  3/30/1932   MRN:  [id number]   PCP:  Carlos Mckay MD  Treatment Team: Attending Provider: Katie Lizama MD; Utilization Review: Celina Ann; Care Manager: Amy Thomas RN; : Shreyas Wood; Care Manager: Felix Edmondson LMSW    HPI/Subjective:   Mr. Jasiel Solis is an 79 y/o WM admitted on  with leukocytosis and frequent falls.     : In bed, looks well, is now up ambulating with PT in the halls and is doing great. Asking me if I've seen his mama. Denies pain. No acute events overnight. ROS neg. Objective:     Patient Vitals for the past 24 hrs:   Temp Pulse Resp BP SpO2   19 0415 97.2 °F (36.2 °C) 71 14 115/58 96 %   19 0001 97.5 °F (36.4 °C) 70 16 140/76 96 %   19 1245 97.9 °F (36.6 °C) 81 16 131/78 94 %     Oxygen Therapy  O2 Sat (%): 96 % (19 0415)  Pulse via Oximetry: 69 beats per minute (19 0822)  O2 Device: Room air (19 0715)  Estimated body mass index is 20.89 kg/m² as calculated from the following:    Height as of this encounter: 6' (1.829 m). Weight as of this encounter: 69.9 kg (154 lb). No intake or output data in the 24 hours ending 19 0944    *Note that automatically entered I/Os may not be accurate; dependent on patient compliance with collection and accurate  by "[employer]. General:    Well nourished. Alert. Chronically ill appearing. Pleasantly confused. CV:   RRR. No murmur, rub, or gallop. Lungs:   CTAB. No wheezing, rhonchi, or rales. Abdomen:   Soft, nontender, nondistended. Extremities: Warm and dry. No cyanosis or edema. Skin:     No rashes or jaundice. Laceration above left eyebrow with stitches. Lacerations over arms and face, scattered ecchymoses. Neuro:  No gross focal deficits. Pleasantly confused.     Data Review:  I have reviewed all labs, meds, and studies from the last 24 hours:    No results found for this or any previous visit (from the past 24 hour(s)). All Micro Results     Procedure Component Value Units Date/Time    CULTURE, BLOOD [[id number]] Collected:  08/26/19 0616    Order Status:  Completed Specimen:  Blood Updated:  08/30/19 0924     Special Requests: --        RIGHT  HAND       Culture result: NO GROWTH 4 DAYS       CULTURE, BLOOD [[id number]] Collected:  08/26/19 0616    Order Status:  Completed Specimen:  Blood Updated:  08/30/19 0924     Special Requests: --        NO SPECIAL REQUESTS  RIGHT  Antecubital       Culture result: NO GROWTH 4 DAYS       CULTURE, URINE [[id number]] Collected:  08/26/19 1145    Order Status:  Completed Specimen:  Urine from Clean catch Updated:  08/29/19 0644     Special Requests: NO SPECIAL REQUESTS        Culture result: NO GROWTH 2 DAYS             No results found for this visit on 08/26/19. Current Meds:  Current Facility-Administered Medications   Medication Dose Route Frequency    OLANZapine (ZyPREXA) tablet 2.5 mg  2.5 mg Oral QHS    cefpodoxime (VANTIN) tablet 200 mg  200 mg Oral Q12H    haloperidol lactate (HALDOL) injection 2 mg  2 mg IntraMUSCular Q4H PRN    sodium chloride (NS) flush 5-40 mL  5-40 mL IntraVENous Q8H    sodium chloride (NS) flush 5-40 mL  5-40 mL IntraVENous PRN    acetaminophen (TYLENOL) tablet 650 mg  650 mg Oral Q6H PRN    ondansetron (ZOFRAN) injection 4 mg  4 mg IntraVENous Q6H PRN    docusate sodium (COLACE) capsule 100 mg  100 mg Oral DAILY PRN    pantoprazole (PROTONIX) tablet 40 mg  40 mg Oral ACB    predniSONE (DELTASONE) tablet 10 mg  10 mg Oral DAILY WITH BREAKFAST       Other Studies (last 24 hours):  No results found. Assessment and Plan:     Hospital Problems as of 8/30/2019 Never Reviewed          Codes Class Noted - Resolved POA    Falls ICD-10-CM: W19Sal Vang  ICD-9-CM: P500.1  8/26/2019 - Present Yes        * (Principal) [facility] ICD-10-CM: N17.9  ICD-9-CM: 584.9  8/26/2019 - Present Yes        Long term systemic steroid user ICD-10-CM: Z79.52  ICD-9-CM: V58.65  8/26/2019 - Present Yes        Leukocytosis ICD-10-CM: E75.782  ICD-9-CM: 288.60  8/26/2019 - Present Yes              Plan:  # Debility and deconditioning with frequent falls              - PT/OT. Declined by [facility] for return. CM following. Likely STR locally then eventually somewhere near family in [place].      # Dementia with behavioral disturbances              - zyprexa decreased, less somnolent today.     # Leukocytosis              - Resolved. CXR neg, cultures neg to date, perhaps urinary source based on UA so IV changed to [facility] 8/27.     # UTI/bacteriuria              - Unsure if symptomatic or not based on mental status so will treat as though it is. As above.     # ROSA              - Resolved     Olanzapine     # Long term steroid use              - unclear why he's on chronic prednisone but this has been continued.       DC planning/Dispo: Tentative plan would be STR locally then eventually get him back near his daughter in [place].    Diet:  DIET MECHANICAL SOFT  DVT ppx: SCDs     Signed:  Catalina Tapia MD

## 2019-08-30 NOTE — PROGRESS NOTES
Problem: Mobility Impaired (Adult and Pediatric)  Goal: *Acute Goals and Plan of Care (Insert Text)  Description    LTG:  (1.)Mr. Lewis will move from supine to sit and sit to supine  in bed with SUPERVISION within 7 treatment day(s). (2.)Mr. Lewis will transfer from bed to chair and chair to bed with STAND BY ASSIST using the least restrictive device within 7 treatment day(s). (3.)Mr. Lewis will ambulate with STAND BY ASSIST for 200 feet with the least restrictive device within 7 treatment day(s). (4)Mr. Lewis will perform HEP with cues and assistance to increase safety on his feet in 7 days. ________________________________________________________________________________________________     Outcome: Progressing Towards Goal     PHYSICAL THERAPY: Daily Note and AM 8/30/2019  INPATIENT: PT Visit Days : 4  [place] / [place] / Product Type: Medicare /       NAME/AGE/GENDER: Betzaida Sanchez is a 80 y.o. male   PRIMARY DIAGNOSIS: ROSA (acute kidney injury) ([facility] [N17.9] ROSA (acute kidney injury) ([facility]   ROSA (acute kidney injury) ([facility]         ICD-10: Treatment Diagnosis:    · Generalized Muscle Weakness (M62.81)  · Other abnormalities of gait and mobility (R26.89)   Precaution/Allergies:  Patient has no known allergies. ASSESSMENT:     Mr. Meaghan Gavin presents with decreased functional mobility and gait and confusion. Pt. Agreeable to get up this am, remains very pleasant and following one step commands. All movements are slow and takes time for him to move. He ambulated in the gould with hand held assist at first and then CGA/min assist. Tried walker but it did not work-pt just did not know what to do with it He continues to need constant guidance and direction. He was left in chair with alarm in chair and RN notified. This section established at most recent assessment   PROBLEM LIST (Impairments causing functional limitations):  1.  Decreased Strength  2. Decreased ADL/Functional Activities  3. Decreased Transfer Abilities  4. Decreased Ambulation Ability/Technique  5. Decreased Balance  6. Increased Pain  7. Decreased Activity Tolerance  8. Decreased Flexibility/Joint Mobility  9. Decreased Knowledge of Precautions  10. Decreased [place] with Home Exercise Program  11. Decreased Cognition   INTERVENTIONS PLANNED: (Benefits and precautions of physical therapy have been discussed with the patient.)  1. Balance Exercise  2. Bed Mobility  3. Family Education  4. Gait Training  5. Home Exercise Program (HEP)  6. Range of Motion (ROM)  7. Therapeutic Activites  8. Therapeutic Exercise/Strengthening  9. Transfer Training     TREATMENT PLAN: Frequency/Duration: daily for duration of hospital stay  Rehabilitation Potential For Stated Goals: fair       REHAB RECOMMENDATIONS (at time of discharge pending progress):    Placement: To be determined  Equipment:    None at this time              HISTORY:   History of Present Injury/Illness (Reason for Referral): May Mehta is a 80 y.o. male who was brought to ER due to a fall and injury to his face today. Patient was getting up and was going to a bathroom. No report of lightheadedness. No report of slippery events. Patient fell on the floor from standing and hurt his face. No loss of consciousness. Patient could not provide much information now. No fever. No shaking. No chills. No chest pain. No shortness of breath. No fracture was found. Reportedly patient had 7 falls this month. He was found to have leukocytosis. Empiric IV antibiotics were started. Hospitalist service is requested to admit the patient. Past Medical History/Comorbidities:   Mr. Jesus Lockwood  has a past medical history of Psychiatric disorder. Mr. Jesus Lockwood  has no past surgical history on file.   Social History/Living Environment:   Home Environment: [address] [place] [address] Road Name: [employer]  # Steps to Enter: 0  One/Two Story Residence: One story  Living Alone: No  Support Systems: Assisted living  Patient Expects to be Discharged to[de-identified] Unknown  Current DME Used/Available at Home: Other (comment)(at [facility])  Tub or Shower Type: Shower  Prior Level of Function/Work/Activity: At assisted living, unable to get any history from patient   Number of Personal Factors/Comorbidities that affect the Plan of Care: 3+: HIGH COMPLEXITY   EXAMINATION:   Most Recent Physical Functioning:   Gross Assessment:                  Posture:     Balance:  Sitting: Intact  Standing: Impaired  Standing - Static: Fair  Standing - Dynamic : Fair Bed Mobility:  Supine to Sit: Minimum assistance  Wheelchair Mobility:     Transfers:  Sit to Stand: Stand-by assistance;Contact guard assistance  Stand to Sit: Stand-by assistance;Contact guard assistance  Gait:     Speed/Nayana: Delayed  Step Length: Left shortened;Right shortened  Gait Abnormalities: Decreased step clearance  Distance (ft): 100 Feet (ft)  Assistive Device: (hand held assist)  Ambulation - Level of Assistance: Contact guard assistance;Minimal assistance  Interventions: Safety awareness training;Verbal cues      Body Structures Involved:  1. Bones  2. Joints  3. Muscles  4. Ligaments Body Functions Affected:  1. Movement Related Activities and Participation Affected:  1. Mobility   Number of elements that affect the Plan of Care: 3: MODERATE COMPLEXITY   CLINICAL PRESENTATION:   Presentation: Stable and uncomplicated: LOW COMPLEXITY   CLINICAL DECISION MAKIN [facility] [address] [id number] AM-PAC 6 Clicks   Basic Mobility Inpatient Short Form  How much difficulty does the patient currently have. .. Unable A Lot A Little None   1. Turning over in bed (including adjusting bedclothes, sheets and blankets)? ? 1   ? 2   ? 3   ? 4   2. Sitting down on and standing up from a chair with arms ( e.g., wheelchair, bedside commode, etc.)   ? 1   ? 2   ? 3   ? 4   3.   Moving from lying on back to sitting on the side of the bed?   ? 1   ? 2   ? 3   ? 4   How much help from another person does the patient currently need. .. Total A Lot A Little None   4. Moving to and from a bed to a chair (including a wheelchair)? ? 1   ? 2   ? 3   ? 4   5. Need to walk in hospital room? ? 1   ? 2   ? 3   ? 4   6. Climbing 3-5 steps with a railing? ? 1   ? 2   ? 3   ? 4   © 2007, Trustees of [facility] [employer], under license to [employer]. All rights reserved      Score:  Initial: 17 Most Recent: X (Date: -- )    Interpretation of Tool:  Represents activities that are increasingly more difficult (i.e. Bed mobility, Transfers, Gait). Medical Necessity:     · Patient is expected to demonstrate progress in strength, range of motion, balance, coordination and functional technique  ·  to decrease assistance required with exercises and functional mobility   · . Reason for Services/Other Comments:  · Patient continues to require present interventions due to patient's inability to perform exercises and functional mobility independently   · . Use of outcome tool(s) and clinical judgement create a POC that gives a: Difficult prediction of patient's progress: HIGH COMPLEXITY            TREATMENT:   (In addition to Assessment/Re-Assessment sessions the following treatments were rendered)   Pre-treatment Symptoms/Complaints:  No complaints at all  Pain: Initial:   Pain Intensity 1: 0  Post Session:  0     Therapeutic Activity: (    15): Therapeutic activities including Bed transfers, Chair transfers, Ambulation on level ground and standing to improve mobility, strength and balance. Required minimal Safety awareness training;Verbal cues to promote static and dynamic balance in standing. Braces/Orthotics/Lines/Etc:   · O2 Device: Room air  Treatment/Session Assessment:    · Response to Treatment:  Doing well, remains confused but pleasant.   · Interdisciplinary Collaboration:   o Registered Nurse  · After treatment position/precautions:   o Up in chair  o Bed alarm/tab alert on  o Bed/Chair-wheels locked  o Bed in low position  o Call light within reach  o RN notified   · Compliance with Program/Exercises: Will assess as treatment progresses  · Recommendations/Intent for next treatment session: \"Next visit will focus on reduction in assistance provided\".   Total Treatment Duration:  PT Patient Time In/Time Out  Time In: 0930  Time Out: Patti Hernandez 14, PT

## 2019-08-30 NOTE — DISCHARGE SUMMARY
Hospitalist Discharge Summary     Admit Date:  2019  5:02 AM   Name:  Wm Ashley   Age:  80 y.o.  :  3/30/1932   MRN:  [id number]   PCP:  Amanda Ahn MD  Treatment Team: Attending Provider: Navarro Etienne MD; Utilization Review: Kristin Kennedy; Care Manager: Ash Paul, RN; : Desi Cerrato; Care Manager: Davon Cowart LMSW    Problem List for this Hospitalization:  Hospital Problems as of 2019 Date Reviewed: 2019          Codes Class Noted - Resolved POA    Falls ICD-10-CM: W19. Vasile Ochoa  ICD-9-CM: E888.9  2019 - Present Yes        Long term systemic steroid user ICD-10-CM: Z79.52  ICD-9-CM: V58.65  2019 - Present Yes        * (Principal) RESOLVED: ROSA (acute kidney injury) ([facility] [address] ICD-10-CM: N17.9  ICD-9-CM: 584.9  2019 - 2019 Yes        RESOLVED: Leukocytosis ICD-10-CM: Z37.623  ICD-9-CM: 288.60  2019 - 2019 Yes            Hospital Course:  Mr. Mann Cotton is an 81 y/o WM with a h/o dementia who was brought to the ER on  from his facility with frequent falls. He has been brought to the ER several times for various issues this month but never admitted. Labs notable for sCr 2.2 and WBCs 19K. CXR was clear and cultures were drawn. UA with WBCs and bacteria. He was empirically started on antibiotics. He lost IV access but WBCs and sCr normalized after his first night in the hospital. He was changed to Vantin to cover a possible urinary source. Cultures are negative to date. Zyprexa was restarted for periods of intermittent agitation; this dose was ultimately reduced from 5mg to 2.5mg due to daytime somnolence and he has tolerated this change well. He is ambulating with PT and doing quite well. Tolerating his diet. CM in contact with daughter and tentative plan is to get him to STR locally and for him to ultimately go back to and live near his daughter in [place].  He is medically stable for discharge today and his hospital course was otherwise unremarkable. He does have a laceration over his left eyebrow with stitches but I can't figure out when this was done, so his sutures should be removed in a few days. Disposition: Rehab Facility  Activity: Activity as tolerated  Diet: DIET MECHANICAL SOFT  Code Status: DNR      Follow up instructions, discharge meds at bottom of this note. Plan was discussed with patient, nursing, CM. All questions answered. Patient was stable at time of discharge. Patient will call a physician or return if any concerns. Diagnostic Imaging/Tests:   Xr Chest Pa Lat    Result Date: 8/26/2019  EXAM: TEMPORARY INDICATION: Chest trauma COMPARISON: 8/22/2019. FINDINGS: PA and lateral radiographs of the chest demonstrate COPD with interstitial fibrosis. . The cardiac and mediastinal contours and pulmonary vascularity are normal. The bones and soft tissues are within normal limits. IMPRESSION: COPD with interstitial fibrosis. No acute abnormalities. Xr Pelv Ap Only    Result Date: 8/26/2019  X-ray AP pelvis HISTORY: Pain. COMPARISON: None. FINDINGS: No evidence of fractures or dislocation. Soft tissues are normal.     IMPRESSION: Unremarkable pelvis. Xr Femur Lt 2 V    Result Date: 8/26/2019  EXAM:  TEMPORARY INDICATION:   Pain COMPARISON: None. FINDINGS: 2  views of the left femur demonstrate no fracture or other osseous, articular or soft tissue abnormality. IMPRESSION:  Normal left femur. Ct Head Wo Cont    Result Date: 8/26/2019  CT HEAD WITHOUT CONTRAST HISTORY:  Head trauma. COMPARISON: 8/23/2019 TECHNIQUE: Axial imaging was performed without intravenous contrast utilizing 5mm slice thickness. Sagittal and coronal reformats were performed. Radiation dose reduction techniques were used for this study. Our CT scanner uses one or all of the following: Automated exposure control, adjustment of the MAS or KUB according to patient's size and iterative reconstruction.  FINDINGS: *BRAIN:    -  There are no early signs of territorial or lacunar infarction by CT.    -  No intracranial mass, hematoma, or hydrocephalus. -  For patient's age, the scattered areas of white matter hypodensities may represent a chronic small vessel white matter ischemia. However this is nonspecific. *VISUALIZED PARANASAL SINUSES: Well aerated. *MASTOIDS:  Clear. *CALVARIUM AND SCALP: Unremarkable. IMPRESSION: No acute intracranial abnormalities. Date of Dictation: 8/26/2019 6:03 AM       Echocardiogram results:  No results found for this visit on 08/26/19. Procedures done this admission:  * No surgery found *    All Micro Results     Procedure Component Value Units Date/Time    CULTURE, BLOOD [[id number]] Collected:  08/26/19 0616    Order Status:  Completed Specimen:  Blood Updated:  08/30/19 0924     Special Requests: --        RIGHT  HAND       Culture result: NO GROWTH 4 DAYS       CULTURE, BLOOD [[id number]] Collected:  08/26/19 0616    Order Status:  Completed Specimen:  Blood Updated:  08/30/19 0924     Special Requests: --        NO SPECIAL REQUESTS  RIGHT  Antecubital       Culture result: NO GROWTH 4 DAYS       CULTURE, URINE [[id number]] Collected:  08/26/19 1145    Order Status:  Completed Specimen:  Urine from Clean catch Updated:  08/29/19 0644     Special Requests: NO SPECIAL REQUESTS        Culture result: NO GROWTH 2 DAYS             Labs: Results:       BMP, Mg, Phos Recent Labs     08/28/19  0635      K 4.0      CO2 29   AGAP 6*   BUN 28*   CREA 1.10   CA 8.5   GLU 75      CBC No results for input(s): WBC, RBC, HGB, HCT, PLT, GRANS, LYMPH, EOS, MONOS, BASOS, IG, ANEU, ABL, MARLY, ABM, ABB, AIG, HGBEXT, HCTEXT, PLTEXT in the last 72 hours. LFT No results for input(s): SGOT, ALT, TBIL, AP, TP, ALB, GLOB, AGRAT, GPT in the last 72 hours.    Cardiac Testing Lab Results   Component Value Date/Time     (H) 08/03/2019 10:59 AM      Coagulation Tests No results found for: PTP, INR, APTT   A1c No results found for: HBA1C, HGBE8, CBJ5LGIN   Lipid Panel No results found for: CHOL, CHOLPOCT, CHOLX, CHLST, CHOLV, 068490, HDL, LDL, LDLC, DLDLP, 677505, VLDLC, VLDL, TGLX, TRIGL, TRIGP, TGLPOCT, CHHD, CHHDX   Thyroid Panel No results found for: TSH, T4, FT4, TT3, T3U, TSHEXT     Most Recent UA Lab Results   Component Value Date/Time    Color YELLOW 08/26/2019 11:45 AM    Appearance CLEAR 08/26/2019 11:45 AM    Specific gravity 1.011 08/22/2019 10:27 PM    pH (UA) 5.5 08/26/2019 11:45 AM    Protein 100 (A) 08/26/2019 11:45 AM    Glucose NEGATIVE  08/26/2019 11:45 AM    Ketone NEGATIVE  08/26/2019 11:45 AM    Bilirubin NEGATIVE  08/26/2019 11:45 AM    Blood MODERATE (A) 08/26/2019 11:45 AM    Urobilinogen 0.2 08/26/2019 11:45 AM    Nitrites NEGATIVE  08/26/2019 11:45 AM    Leukocyte Esterase TRACE (A) 08/26/2019 11:45 AM    WBC  08/26/2019 11:45 AM    RBC 5-10 08/26/2019 11:45 AM    Epithelial cells 5-10 08/26/2019 11:45 AM    Bacteria 1+ (H) 08/26/2019 11:45 AM    Other observations RESULTS VERIFIED MANUALLY 08/26/2019 11:45 AM        No Known Allergies  Immunization History   Administered Date(s) Administered    TB Skin Test (PPD) Intradermal 08/26/2019       All Labs from Last 24 Hrs:  No results found for this or any previous visit (from the past 24 hour(s)).     Current Med List in Hospital:   Current Facility-Administered Medications   Medication Dose Route Frequency    OLANZapine (ZyPREXA) tablet 2.5 mg  2.5 mg Oral QHS    cefpodoxime (VANTIN) tablet 200 mg  200 mg Oral Q12H    haloperidol lactate (HALDOL) injection 2 mg  2 mg IntraMUSCular Q4H PRN    sodium chloride (NS) flush 5-40 mL  5-40 mL IntraVENous Q8H    sodium chloride (NS) flush 5-40 mL  5-40 mL IntraVENous PRN    acetaminophen (TYLENOL) tablet 650 mg  650 mg Oral Q6H PRN    ondansetron (ZOFRAN) injection 4 mg  4 mg IntraVENous Q6H PRN    docusate sodium (COLACE) capsule 100 mg  100 mg Oral DAILY PRN    pantoprazole (PROTONIX) tablet 40 mg  40 mg Oral ACB    predniSONE (DELTASONE) tablet 10 mg  10 mg Oral DAILY WITH BREAKFAST       Discharge Exam:  Patient Vitals for the past 24 hrs:   Temp Pulse Resp BP SpO2   08/30/19 0840 98.5 °F (36.9 °C) (!) 52 16 112/65 94 %   08/30/19 0415 97.2 °F (36.2 °C) 71 14 115/58 96 %   08/30/19 0001 97.5 °F (36.4 °C) 70 16 140/76 96 %   08/29/19 1245 97.9 °F (36.6 °C) 81 16 131/78 94 %     Oxygen Therapy  O2 Sat (%): 94 % (08/30/19 0840)  Pulse via Oximetry: 69 beats per minute (08/26/19 0822)  O2 Device: Room air (08/28/19 0715)  No intake or output data in the 24 hours ending 08/30/19 1201    *Note that automatically entered I/Os may not be accurate; dependent on patient compliance with collection and accurate  by assistants. General:    Well nourished. Alert. Pleasantly confused. Chronically ill apearing. Eyes:   Normal sclerae. Extraocular movements intact. ENT:  Normocephalic, atraumatic. Moist mucous membranes  CV:   Regular rate and rhythm. No murmur, rub, or gallop. Lungs:  Clear to auscultation bilaterally. No wheezing, rhonchi, or rales. Abdomen: Soft, nontender, nondistended. Bowel sounds normal.   Extremities: Warm and dry. No cyanosis or edema. Neurologic: CN II-XII grossly intact. Sensation intact. Pleasantly confused. Skin:     No rashes or jaundice. Laceration over left eyebrow, sutured. Lacerations over arms and face with scattered ecchymoses. Psych:  Pleasant. Discharge Info:   Current Discharge Medication List      START taking these medications    Details   cefpodoxime (VANTIN) 200 mg tablet Take 1 Tab by mouth every twelve (12) hours for 2 days. Qty: 4 Tab, Refills: 0         CONTINUE these medications which have CHANGED    Details   OLANZapine (ZYPREXA) 2.5 mg tablet Take 1 Tab by mouth nightly.   Qty: 10 Tab, Refills: 0         CONTINUE these medications which have NOT CHANGED    Details   cyanocobalamin (VITAMIN B12) 500 mcg tablet Take 500 mcg by mouth daily. predniSONE (DELTASONE) 10 mg tablet Take 10 mg by mouth daily (with breakfast). acetaminophen (TYLENOL) 325 mg tablet Take 650 mg by mouth every eight (8) hours as needed for Pain. omeprazole (PRILOSEC) 20 mg capsule Take 20 mg by mouth daily. STOP taking these medications       traZODone (DESYREL) 50 mg tablet Comments:   Reason for Stopping:         melatonin 3 mg tablet Comments:   Reason for Stopping:         LORazepam (ATIVAN) 1 mg tablet Comments:   Reason for Stopping: Follow Up Orders: Follow-up Appointments   Procedures    FOLLOW UP VISIT Appointment in: Other ([facility]) Facility physician PCP -- 1 week     Facility physician  PCP -- 1 week     Standing Status:   Standing     Number of Occurrences:   1     Order Specific Question:   Appointment in     Answer: Other (Specify)       Follow-up Information     Follow up With Specialties Details Why Contact Info    Fely Glibert MD Psychiatry   [address] [place] [address] 160 [phone]            Time spent in patient discharge planning and coordination 35 minutes.     Signed:  Catalina Tapia MD

## 2019-08-31 LAB
BACTERIA SPEC CULT: NORMAL
BACTERIA SPEC CULT: NORMAL
SERVICE CMNT-IMP: NORMAL
SERVICE CMNT-IMP: NORMAL